# Patient Record
Sex: FEMALE | Race: WHITE | NOT HISPANIC OR LATINO | Employment: OTHER | URBAN - METROPOLITAN AREA
[De-identification: names, ages, dates, MRNs, and addresses within clinical notes are randomized per-mention and may not be internally consistent; named-entity substitution may affect disease eponyms.]

---

## 2018-08-21 ENCOUNTER — OFFICE VISIT (OUTPATIENT)
Dept: CARDIOLOGY | Facility: CLINIC | Age: 68
End: 2018-08-21

## 2018-08-21 VITALS
HEIGHT: 66 IN | BODY MASS INDEX: 34.75 KG/M2 | WEIGHT: 216.2 LBS | HEART RATE: 79 BPM | DIASTOLIC BLOOD PRESSURE: 80 MMHG | SYSTOLIC BLOOD PRESSURE: 130 MMHG

## 2018-08-21 DIAGNOSIS — R00.2 PALPITATIONS: Primary | ICD-10-CM

## 2018-08-21 DIAGNOSIS — E66.09 CLASS 2 OBESITY DUE TO EXCESS CALORIES WITHOUT SERIOUS COMORBIDITY WITH BODY MASS INDEX (BMI) OF 35.0 TO 35.9 IN ADULT: ICD-10-CM

## 2018-08-21 DIAGNOSIS — I49.3 PVC (PREMATURE VENTRICULAR CONTRACTION): ICD-10-CM

## 2018-08-21 DIAGNOSIS — R07.2 PRECORDIAL PAIN: ICD-10-CM

## 2018-08-21 PROCEDURE — 99204 OFFICE O/P NEW MOD 45 MIN: CPT | Performed by: INTERNAL MEDICINE

## 2018-08-21 PROCEDURE — 93000 ELECTROCARDIOGRAM COMPLETE: CPT | Performed by: INTERNAL MEDICINE

## 2018-08-21 RX ORDER — CHLORTHALIDONE 25 MG/1
25 TABLET ORAL DAILY
COMMUNITY

## 2018-08-21 RX ORDER — SIMVASTATIN 40 MG
40 TABLET ORAL NIGHTLY
COMMUNITY

## 2018-08-21 RX ORDER — LEVOTHYROXINE SODIUM 0.05 MG/1
50 TABLET ORAL DAILY
COMMUNITY
End: 2022-09-22 | Stop reason: HOSPADM

## 2018-08-21 RX ORDER — LANSOPRAZOLE 30 MG/1
30 CAPSULE, DELAYED RELEASE ORAL DAILY
COMMUNITY

## 2018-08-21 RX ORDER — ATENOLOL 50 MG/1
50 TABLET ORAL 2 TIMES DAILY
COMMUNITY
End: 2022-09-22 | Stop reason: HOSPADM

## 2018-08-21 RX ORDER — LISINOPRIL 20 MG/1
20 TABLET ORAL DAILY
COMMUNITY
End: 2022-09-22 | Stop reason: HOSPADM

## 2018-08-21 RX ORDER — GLIMEPIRIDE 4 MG/1
4 TABLET ORAL 2 TIMES DAILY
COMMUNITY
End: 2022-09-22 | Stop reason: HOSPADM

## 2018-08-21 NOTE — PROGRESS NOTES
Date of Office Visit: 2018  Encounter Provider: Brian Weems MD  Place of Service: [facility] CARDIOLOGY  Patient Name: Alejandra Varner  :1950  [id number]    Chief Complaint   Patient presents with   • Irregular Heart Beat   :     HPI: Alejandra Varner is a 68 y.o. female She comes to see me.  I have seen here before a long time ago for palpitations and she was having a few premature ventricular contractions.  She was doing pretty well but then, a couple of months ago, they really started kicking up.  She started having a lot of premature ventricular contractions and was very symptomatic with them.  She was a little short of breath and had a little chest discomfort.  They were not really related to activity.  Nothing seems to make them better or worse although, if she is having them and she has some caffeine, they get a lot more pronounced.  They do not wake her up from sleep.  She does not have syncope.  She has had her thyroid checked and her potassium checked and it is okay.  She is tired.  She does snore.  She has never been checked to see if she has sleep apnea.  She had an event recorder put on and had a lot of ectopy on that but no ventricular tachycardia.  She has diabetes.  She has hypertension and hyperlipidemia.  She is obese.  She does not smoke.  She is under a lot of stress because her  has been sick, and I take care of him.        Past Medical History:   Diagnosis Date   • Chronic kidney disease    • Diabetes 1.5, managed as type 2 (CMS/[facility])    • Hernia, hiatal    • Hyperlipidemia    • Hypertension    • Thyroid trouble        Past Surgical History:   Procedure Laterality Date   • CYSTOSCOPY BLADDER STONE LITHOTRIPSY     • TOTAL ABDOMINAL HYSTERECTOMY         Social History     Social History   • Marital status:      Spouse name: N/A   • Number of children: N/A   • Years of education: N/A     Occupational History   • Not on file.     Social  History Main Topics   • Smoking status: Not on file   • Smokeless tobacco: Not on file   • Alcohol use Not on file   • Drug use: Unknown   • Sexual activity: Not on file     Other Topics Concern   • Not on file     Social History Narrative   • No narrative on file       No family history on file.    Review of Systems   Constitution: Negative for decreased appetite, fever, malaise/fatigue and weight loss.   HENT: Negative for nosebleeds.    Eyes: Negative for double vision.   Cardiovascular: Negative for chest pain, claudication, cyanosis, dyspnea on exertion, irregular heartbeat, leg swelling, near-syncope, orthopnea, palpitations, paroxysmal nocturnal dyspnea and syncope.   Respiratory: Negative for cough, hemoptysis and shortness of breath.    Hematologic/Lymphatic: Negative for bleeding problem.   Skin: Negative for rash.   Musculoskeletal: Negative for falls and myalgias.   Gastrointestinal: Negative for hematochezia, jaundice, melena, nausea and vomiting.   Genitourinary: Negative for hematuria.   Neurological: Negative for dizziness and seizures.   Psychiatric/Behavioral: Negative for altered mental status and memory loss.       No Known Allergies    No current outpatient prescriptions on file.      Objective:     There were no vitals filed for this visit.  There is no height or weight on file to calculate BMI.    Physical Exam   Constitutional: She is oriented to person, place, and time. She appears well-developed and well-nourished.   HENT:   Head: Normocephalic.   Eyes: No scleral icterus.   Neck: No JVD present. No thyromegaly present.   Cardiovascular: Normal rate, regular rhythm and normal heart sounds.  Exam reveals no gallop and no friction rub.    No murmur heard.  Pulmonary/Chest: Effort normal and breath sounds normal. She has no wheezes. She has no rales.   Abdominal: Soft. There is no hepatosplenomegaly. There is no tenderness.   Musculoskeletal: Normal range of motion. She exhibits no edema.    Lymphadenopathy:     She has no cervical adenopathy.   Neurological: She is alert and oriented to person, place, and time.   Skin: Skin is warm and dry. No rash noted.   Psychiatric: She has a normal mood and affect.         ECG 12 Lead  Date/Time: 8/21/2018 11:49 AM  Performed by: REGULO WEEMS  Authorized by: REGULO WEEMS   Comparison: compared with previous ECG   Similar to previous ECG  Rhythm: sinus rhythm  Clinical impression: normal ECG  Comments: ns ST-T wave abnormality             Assessment:       Diagnosis Plan   1. Palpitations     2. PVC (premature ventricular contraction)     3. Precordial pain     4. Class 2 obesity due to excess calories without serious comorbidity with body mass index (BMI) of 35.0 to 35.9 in adult            Plan:       She is having pretty symptomatic premature ventricular contractions and she is having a lot of them.  I think that we need to repeat her testing that we did years ago on her.  I would love her to get a regular treadmill and see what she does, although she is worried with arthritis in her knee that she may not be able to do that, but we are going to try it.  I would like her to get an echocardiogram.  I have asked her to start on some magnesium and see if that helps knock these down.  If it does not then we are going to put her on flecainide 50 mg twice a day if her stress and echocardiogram are normal.  I am also going to check her for a sleep study.        As always, it has been a pleasure to participate in your patient's care.      Sincerely,       Regulo Weems MD

## 2018-08-30 ENCOUNTER — HOSPITAL ENCOUNTER (OUTPATIENT)
Dept: CARDIOLOGY | Facility: HOSPITAL | Age: 68
Discharge: HOME | End: 2018-08-30
Attending: INTERNAL MEDICINE | Admitting: INTERNAL MEDICINE

## 2018-08-30 ENCOUNTER — HOSPITAL ENCOUNTER (OUTPATIENT)
Dept: CARDIOLOGY | Facility: HOSPITAL | Age: 68
Discharge: HOME | End: 2018-08-30
Attending: INTERNAL MEDICINE

## 2018-08-30 VITALS
SYSTOLIC BLOOD PRESSURE: 148 MMHG | DIASTOLIC BLOOD PRESSURE: 74 MMHG | WEIGHT: 216 LBS | HEART RATE: 85 BPM | HEIGHT: 66 IN | BODY MASS INDEX: 34.72 KG/M2

## 2018-08-30 DIAGNOSIS — I49.3 PVC (PREMATURE VENTRICULAR CONTRACTION): ICD-10-CM

## 2018-08-30 DIAGNOSIS — R07.2 PRECORDIAL PAIN: ICD-10-CM

## 2018-08-30 DIAGNOSIS — E66.09 CLASS 2 OBESITY DUE TO EXCESS CALORIES WITHOUT SERIOUS COMORBIDITY WITH BODY MASS INDEX (BMI) OF 35.0 TO 35.9 IN ADULT: ICD-10-CM

## 2018-08-30 DIAGNOSIS — R00.2 PALPITATIONS: ICD-10-CM

## 2018-08-30 PROCEDURE — 93016 CV STRESS TEST SUPVJ ONLY: CPT | Performed by: INTERNAL MEDICINE

## 2018-08-30 PROCEDURE — 93018 CV STRESS TEST I&R ONLY: CPT | Performed by: INTERNAL MEDICINE

## 2018-08-30 PROCEDURE — 93017 CV STRESS TEST TRACING ONLY: CPT

## 2018-08-30 PROCEDURE — 93306 TTE W/DOPPLER COMPLETE: CPT | Performed by: INTERNAL MEDICINE

## 2018-08-30 PROCEDURE — 93306 TTE W/DOPPLER COMPLETE: CPT

## 2018-08-31 ENCOUNTER — TELEPHONE (OUTPATIENT)
Dept: CARDIOLOGY | Facility: CLINIC | Age: 68
End: 2018-08-31

## 2018-09-05 ENCOUNTER — APPOINTMENT (OUTPATIENT)
Dept: SLEEP MEDICINE | Facility: HOSPITAL | Age: 68
End: 2018-09-05
Attending: INTERNAL MEDICINE

## 2021-10-12 ENCOUNTER — IMMUNIZATION (OUTPATIENT)
Dept: VACCINE CLINIC | Facility: HOSPITAL | Age: 71
End: 2021-10-12

## 2021-10-12 PROCEDURE — 91300 HC SARSCOV02 VAC 30MCG/0.3ML IM: CPT | Performed by: INTERNAL MEDICINE

## 2021-10-12 PROCEDURE — 0004A ADM SARSCOV2 30MCG/0.3ML BOOSTER: CPT | Performed by: INTERNAL MEDICINE

## 2022-06-16 PROCEDURE — 93321 DOPPLER ECHO F-UP/LMTD STD: CPT | Performed by: INTERNAL MEDICINE

## 2022-06-16 PROCEDURE — 93325 DOPPLER ECHO COLOR FLOW MAPG: CPT | Performed by: INTERNAL MEDICINE

## 2022-06-16 PROCEDURE — 93308 TTE F-UP OR LMTD: CPT | Performed by: INTERNAL MEDICINE

## 2022-06-21 ENCOUNTER — OUTSIDE FACILITY SERVICE (OUTPATIENT)
Dept: CARDIOLOGY | Facility: CLINIC | Age: 72
End: 2022-06-21

## 2022-08-23 ENCOUNTER — TELEPHONE (OUTPATIENT)
Dept: NEUROSURGERY | Facility: CLINIC | Age: 72
End: 2022-08-23

## 2022-08-23 ENCOUNTER — OFFICE VISIT (OUTPATIENT)
Dept: NEUROSURGERY | Facility: CLINIC | Age: 72
End: 2022-08-23

## 2022-08-23 VITALS
SYSTOLIC BLOOD PRESSURE: 130 MMHG | HEIGHT: 66 IN | DIASTOLIC BLOOD PRESSURE: 84 MMHG | TEMPERATURE: 97.9 F | WEIGHT: 216 LBS | HEART RATE: 100 BPM | OXYGEN SATURATION: 96 % | BODY MASS INDEX: 34.72 KG/M2

## 2022-08-23 DIAGNOSIS — M54.50 ACUTE MIDLINE LOW BACK PAIN WITHOUT SCIATICA: ICD-10-CM

## 2022-08-23 DIAGNOSIS — M48.02 CERVICAL SPINAL STENOSIS: Primary | ICD-10-CM

## 2022-08-23 PROCEDURE — 99204 OFFICE O/P NEW MOD 45 MIN: CPT | Performed by: NEUROLOGICAL SURGERY

## 2022-08-23 RX ORDER — TRAMADOL HYDROCHLORIDE 50 MG/1
50 TABLET ORAL 2 TIMES DAILY PRN
COMMUNITY
Start: 2022-07-23

## 2022-08-23 RX ORDER — DEXAMETHASONE 4 MG/1
8 TABLET ORAL TAKE AS DIRECTED
Qty: 2 TABLET | Refills: 0 | Status: SHIPPED | OUTPATIENT
Start: 2022-08-23 | End: 2022-09-22 | Stop reason: HOSPADM

## 2022-08-23 RX ORDER — CHOLECALCIFEROL (VITAMIN D3) 50 MCG
2000 TABLET ORAL NIGHTLY
COMMUNITY

## 2022-08-23 RX ORDER — CYCLOBENZAPRINE HCL 5 MG
5 TABLET ORAL NIGHTLY PRN
COMMUNITY
Start: 2022-06-16

## 2022-08-23 RX ORDER — PROPRANOLOL HYDROCHLORIDE 40 MG/1
40 TABLET ORAL 2 TIMES DAILY
COMMUNITY

## 2022-08-23 RX ORDER — PREGABALIN 50 MG/1
50 CAPSULE ORAL NIGHTLY
COMMUNITY

## 2022-08-23 RX ORDER — LEVOTHYROXINE SODIUM 0.07 MG/1
TABLET ORAL
COMMUNITY
Start: 2022-06-18 | End: 2022-09-22 | Stop reason: HOSPADM

## 2022-08-23 RX ORDER — DEXLANSOPRAZOLE 60 MG/1
CAPSULE, DELAYED RELEASE ORAL DAILY
COMMUNITY
Start: 2013-11-22 | End: 2022-09-22 | Stop reason: HOSPADM

## 2022-08-23 RX ORDER — TIRZEPATIDE 2.5 MG/.5ML
INJECTION, SOLUTION SUBCUTANEOUS WEEKLY
COMMUNITY
Start: 2022-08-10

## 2022-08-23 RX ORDER — LISINOPRIL 20 MG/1
TABLET ORAL DAILY
COMMUNITY
Start: 2013-11-22 | End: 2022-09-22 | Stop reason: HOSPADM

## 2022-08-23 RX ORDER — GLIPIZIDE 5 MG/1
5 TABLET ORAL
COMMUNITY

## 2022-08-23 RX ORDER — ALBUTEROL SULFATE 90 UG/1
AEROSOL, METERED RESPIRATORY (INHALATION)
COMMUNITY
Start: 2014-01-02 | End: 2022-09-22 | Stop reason: HOSPADM

## 2022-08-23 RX ORDER — SIMVASTATIN 40 MG
TABLET ORAL DAILY
COMMUNITY
Start: 2013-11-22 | End: 2022-09-22 | Stop reason: HOSPADM

## 2022-08-23 RX ORDER — ALLOPURINOL 100 MG/1
100 TABLET ORAL NIGHTLY
COMMUNITY
Start: 2022-08-20

## 2022-08-23 RX ORDER — LANOLIN ALCOHOL/MO/W.PET/CERES
400 CREAM (GRAM) TOPICAL 2 TIMES DAILY
COMMUNITY
Start: 2022-06-16 | End: 2022-11-11 | Stop reason: HOSPADM

## 2022-08-23 NOTE — PROGRESS NOTES
"Subjective   Patient ID: Alejandra Varner is a 72 y.o. female is being seen for consultation today at the request of Elysia Reich MD for Foraminal stenosis of cervical region Bone spur. Patient [place] [facility] [place]    Treatment:no recent treatment    Today patient states that she has N/T in B/L Hands, along with R upper arm pain. Patient states that she has low back pain as well.     Patient, Provider, and MA are all wearing a mask in our office today.     History of Present Illness     This patient has been having pain in her neck as well as in her lower back since April of this year.  The problem originally began without a particular incident.  She has noted that she has pain in her neck with some radiation into her right shoulder.  She also has pain in her lower back but not a lot of radiation into her legs.  Her biggest problem is numbness and tingling as well as weakness in her hands.  This is been getting steadily worse.    The following portions of the patient's history were reviewed and updated as appropriate: allergies, current medications, past family history, past medical history, past social history, past surgical history and problem list.    Review of Systems   Constitutional: Negative for chills and fever.   HENT: Negative for congestion.    Musculoskeletal: Positive for back pain and myalgias.        R upper arm pain   Neurological: Positive for weakness and numbness. Negative for headaches.        B/L hands       I have reviewed the review of systems as documented by my MA.      Objective     Vitals:    08/23/22 1217   BP: 130/84   Cuff Size: Adult   Pulse: 100   Temp: 97.9 °F (36.6 °C)   SpO2: 96%   Weight: 98 kg (216 lb)   Height: 167.6 cm (66\")     Body mass index is 34.86 kg/m².      Physical Exam  Constitutional:       Appearance: She is well-developed.   HENT:      Head: Normocephalic and atraumatic.   Eyes:      Extraocular Movements: EOM normal. "      Conjunctiva/sclera: Conjunctivae normal.      Pupils: Pupils are equal, round, and reactive to light.   Neck:      Vascular: No carotid bruit.   Neurological:      Mental Status: She is oriented to person, place, and time.      Coordination: Finger-Nose-Finger Test and Heel to Shin Test normal.      Gait: Gait is intact.      Deep Tendon Reflexes:      Reflex Scores:       Tricep reflexes are 2+ on the right side and 2+ on the left side.       Bicep reflexes are 2+ on the right side and 2+ on the left side.       Brachioradialis reflexes are 2+ on the right side and 2+ on the left side.       Patellar reflexes are 2+ on the right side and 2+ on the left side.       Achilles reflexes are 2+ on the right side and 2+ on the left side.  Psychiatric:         Speech: Speech normal.       Neurologic Exam     Mental Status   Oriented to person, place, and time.   Registration of memory: Good recent and remote memory.   Attention: normal. Concentration: normal.   Speech: speech is normal   Level of consciousness: alert  Knowledge: consistent with education.     Cranial Nerves     CN II   Visual fields full to confrontation.   Visual acuity: normal    CN III, IV, VI   Pupils are equal, round, and reactive to light.  Extraocular motions are normal.     CN V   Facial sensation intact.   Right corneal reflex: normal  Left corneal reflex: normal    CN VII   Facial expression full, symmetric.   Right facial weakness: none  Left facial weakness: none    CN VIII   Hearing: intact    CN IX, X   Palate: symmetric    CN XI   Right sternocleidomastoid strength: normal  Left sternocleidomastoid strength: normal    CN XII   Tongue: not atrophic  Tongue deviation: none    Motor Exam   Muscle bulk: normal  Right arm tone: normal  Left arm tone: normal  Right leg tone: normal  Left leg tone: normal    Strength   Strength 5/5 except as noted.     Sensory Exam   Light touch normal.     Gait, Coordination, and Reflexes     Gait  Gait:  normal    Coordination   Finger to nose coordination: normal  Heel to shin coordination: normal    Reflexes   Right brachioradialis: 2+  Left brachioradialis: 2+  Right biceps: 2+  Left biceps: 2+  Right triceps: 2+  Left triceps: 2+  Right patellar: 2+  Left patellar: 2+  Right achilles: 2+  Left achilles: 2+  Right : 2+  Left : 2+          Assessment & Plan   Independent Review of Radiographic Studies:      I personally reviewed the images from the following studies.    I reviewed an MRI of her cervical spine which shows multilevel spinal stenosis in the cervical spine.  There is no high-level imaging of the lumbar spine.    Medical Decision Making:      I told the patient and her  that we really need to proceed with a cervical and lumbar myelogram.  I told the patient what a myelogram involves.  I explained that there is a 50% chance of developing a bad headache and nausea as a result of the test.  I explained that there is also a very small chance of infection, seizures, and bleeding.  I explained how we would treat a post myelogram headache including bedrest, caffeinated fluids, steroids, and blood patch.  The patient does ask to proceed.    Diagnoses and all orders for this visit:    1. Cervical spinal stenosis (Primary)  -     IR Myelogram 2 or More Areas; Future  -     CT Cervical Spine With Intrathecal Contrast; Future  -     CT Lumbar Spine With Intrathecal Contrast; Future  -     XR Spine Cervical Complete With Flex Ext; Future  -     XR Spine Lumbar Complete With Flex & Ext; Future  -     dexamethasone (DECADRON) 4 MG tablet; Take 2 tablets by mouth Take As Directed. Take both tablets by mouth 2 hours before myelogram  Dispense: 2 tablet; Refill: 0    2. Acute midline low back pain without sciatica  -     IR Myelogram 2 or More Areas; Future  -     CT Cervical Spine With Intrathecal Contrast; Future  -     CT Lumbar Spine With Intrathecal Contrast; Future  -     XR Spine Cervical Complete  With Flex Ext; Future  -     XR Spine Lumbar Complete With Flex & Ext; Future  -     dexamethasone (DECADRON) 4 MG tablet; Take 2 tablets by mouth Take As Directed. Take both tablets by mouth 2 hours before myelogram  Dispense: 2 tablet; Refill: 0      Return for After radiology test.

## 2022-08-23 NOTE — TELEPHONE ENCOUNTER
I called pt's urologist Donato Barnard [phone]. I had to leave a message on the nurse's line. We are planning to do a myelogram on this pt an since she has kidney issues we need to know if she will need fluids via IV either before and or after procedure. If so, what, how much and when it should be administered. I also faxed office note to them with written request for this info. Fax [phone]

## 2022-08-25 ENCOUNTER — TELEPHONE (OUTPATIENT)
Dept: NEUROSURGERY | Facility: CLINIC | Age: 72
End: 2022-08-25

## 2022-08-25 NOTE — TELEPHONE ENCOUNTER
Diana from the office returned call to the office. She states this is her urologist but he would exercise extreme caution as her last creatinine level was 1.49 and GFR was 35. He recommends clarification from patient's nephrologist as well. She will fax the response to the office from Dr. Nicholson.    Letter mailed to patient.    Mari Rizo MA

## 2022-08-25 NOTE — TELEPHONE ENCOUNTER
I l/m for Sudhir Metcalf, DO at [facility]. We are planning to give this pt a Ct myelogram test and she has kidney issues. We need him to ok her to have the approve the test and if he does then we also need for him to either order before and after fluids or give us specific instructions for the hospital.

## 2022-08-30 NOTE — TELEPHONE ENCOUNTER
Dr. Metcalf's office called and requested a written request for clearance, request was faxed to [phone].

## 2022-09-11 NOTE — PROGRESS NOTES
09/22/22 0003   Pre-Procedure Phone Call   Procedure Time Verified Yes   Arrival Time 0600   Procedure Location Verified Yes   Medical History Reviewed No   NPO Status Reinforced Yes   Ride and Caregiver Arranged Yes   Patient Knows to Bring Current Medications   (No changes in medications since last reviewed. Decadron RX'd)   Bring Outside Films Requested   (Dr German patient)

## 2022-09-22 ENCOUNTER — OFFICE VISIT (OUTPATIENT)
Dept: NEUROSURGERY | Facility: CLINIC | Age: 72
End: 2022-09-22

## 2022-09-22 ENCOUNTER — HOSPITAL ENCOUNTER (OUTPATIENT)
Dept: CT IMAGING | Facility: HOSPITAL | Age: 72
Discharge: HOME | End: 2022-09-22

## 2022-09-22 ENCOUNTER — HOSPITAL ENCOUNTER (OUTPATIENT)
Dept: GENERAL RADIOLOGY | Facility: HOSPITAL | Age: 72
Discharge: HOME | End: 2022-09-22

## 2022-09-22 ENCOUNTER — PREP FOR SURGERY (OUTPATIENT)
Dept: OTHER | Facility: HOSPITAL | Age: 72
End: 2022-09-22

## 2022-09-22 VITALS
TEMPERATURE: 98 F | SYSTOLIC BLOOD PRESSURE: 134 MMHG | OXYGEN SATURATION: 97 % | WEIGHT: 205 LBS | BODY MASS INDEX: 32.95 KG/M2 | HEIGHT: 66 IN | HEART RATE: 88 BPM | DIASTOLIC BLOOD PRESSURE: 80 MMHG

## 2022-09-22 VITALS
BODY MASS INDEX: 32.95 KG/M2 | SYSTOLIC BLOOD PRESSURE: 144 MMHG | DIASTOLIC BLOOD PRESSURE: 79 MMHG | TEMPERATURE: 96.9 F | OXYGEN SATURATION: 100 % | HEIGHT: 66 IN | HEART RATE: 70 BPM | RESPIRATION RATE: 16 BRPM | WEIGHT: 205 LBS

## 2022-09-22 DIAGNOSIS — M54.50 ACUTE MIDLINE LOW BACK PAIN WITHOUT SCIATICA: ICD-10-CM

## 2022-09-22 DIAGNOSIS — M48.02 CERVICAL SPINAL STENOSIS: ICD-10-CM

## 2022-09-22 DIAGNOSIS — M48.02 CERVICAL SPINAL STENOSIS: Primary | ICD-10-CM

## 2022-09-22 DIAGNOSIS — E66.09 CLASS 2 OBESITY DUE TO EXCESS CALORIES WITHOUT SERIOUS COMORBIDITY WITH BODY MASS INDEX (BMI) OF 35.0 TO 35.9 IN ADULT: ICD-10-CM

## 2022-09-22 PROCEDURE — 72052 X-RAY EXAM NECK SPINE 6/>VWS: CPT

## 2022-09-22 PROCEDURE — 0 LIDOCAINE 1 % SOLUTION: Performed by: NEUROLOGICAL SURGERY

## 2022-09-22 PROCEDURE — 72132 CT LUMBAR SPINE W/DYE: CPT

## 2022-09-22 PROCEDURE — 99214 OFFICE O/P EST MOD 30 MIN: CPT | Performed by: NEUROLOGICAL SURGERY

## 2022-09-22 PROCEDURE — 72240 MYELOGRAPHY NECK SPINE: CPT

## 2022-09-22 PROCEDURE — 25010000002 IOPAMIDOL 61 % SOLUTION: Performed by: NEUROLOGICAL SURGERY

## 2022-09-22 PROCEDURE — 62305 MYELOGRAPHY LUMBAR INJECTION: CPT

## 2022-09-22 PROCEDURE — 72126 CT NECK SPINE W/DYE: CPT

## 2022-09-22 PROCEDURE — 62284 INJECTION FOR MYELOGRAM: CPT

## 2022-09-22 PROCEDURE — 72114 X-RAY EXAM L-S SPINE BENDING: CPT

## 2022-09-22 PROCEDURE — 62284 INJECTION FOR MYELOGRAM: CPT | Performed by: NEUROLOGICAL SURGERY

## 2022-09-22 RX ORDER — CEFAZOLIN SODIUM 2 G/100ML
2 INJECTION, SOLUTION INTRAVENOUS ONCE
Status: CANCELLED | OUTPATIENT
Start: 2022-10-26 | End: 2022-09-22

## 2022-09-22 RX ORDER — LIDOCAINE HYDROCHLORIDE 10 MG/ML
10 INJECTION, SOLUTION INFILTRATION; PERINEURAL ONCE
Status: COMPLETED | OUTPATIENT
Start: 2022-09-22 | End: 2022-09-22

## 2022-09-22 RX ORDER — HYDROCODONE BITARTRATE AND ACETAMINOPHEN 5; 325 MG/1; MG/1
1 TABLET ORAL EVERY 4 HOURS PRN
Status: DISCONTINUED | OUTPATIENT
Start: 2022-09-22 | End: 2022-09-23 | Stop reason: HOSPADM

## 2022-09-22 RX ORDER — ACETAMINOPHEN 325 MG/1
650 TABLET ORAL EVERY 4 HOURS PRN
Status: DISCONTINUED | OUTPATIENT
Start: 2022-09-22 | End: 2022-09-23 | Stop reason: HOSPADM

## 2022-09-22 RX ADMIN — LIDOCAINE HYDROCHLORIDE 7 ML: 10 INJECTION, SOLUTION INFILTRATION; PERINEURAL at 06:58

## 2022-09-22 RX ADMIN — IOPAMIDOL 15 ML: 612 INJECTION, SOLUTION INTRATHECAL at 06:59

## 2022-09-22 NOTE — PROGRESS NOTES
"Subjective   Patient ID: Alejandra Varner is a 72 y.o. female is here today for follow-up with a new Cervical/Lumbar Myelogram that was done on 09/22/2022 for back, neck and arm pain.    Today patient reports that her symptoms are unchanged. Patient states that she has N/T in B/L hands, along with R upper arm pain, and low back pain    Patient, Provider, and MA are all wearing a mask in our office today    History of Present Illness    This patient returns today.  She had a myelogram this morning.  She continues with pain in her neck and in her lower back.  Pain in her neck radiates into her right shoulder.  She has pain in her lower back as well but not a lot of radiation into her legs.  All of this is been getting steadily worse.    The following portions of the patient's history were reviewed and updated as appropriate: allergies, current medications, past family history, past medical history, past social history, past surgical history and problem list.    Review of Systems   Constitutional: Negative for chills and fever.   HENT: Negative for congestion.    Musculoskeletal: Positive for back pain, myalgias, neck pain and neck stiffness.   Neurological: Positive for weakness and numbness.       I have reviewed the review of systems as documented by my MA.      Objective     Vitals:    09/22/22 1012   BP: 134/80   Cuff Size: Adult   Pulse: 88   Temp: 98 °F (36.7 °C)   SpO2: 97%   Weight: 93 kg (205 lb)   Height: 167.6 cm (66\")     Body mass index is 33.09 kg/m².      Physical Exam  Neurological:      Mental Status: She is alert and oriented to person, place, and time.       Neurologic Exam     Mental Status   Oriented to person, place, and time.           Assessment & Plan   Independent Review of Radiographic Studies:      I personally reviewed the images from the following studies.    I reviewed her plain films, myelogram, and CT scan.  They were all done this morning so there is not yet a report.  On the plain " films of the lumbar spine there is multilevel degenerative disease and some mild degenerative scoliosis.  I do not see any evidence of instability.  In the cervical spine there is also multilevel degenerative disease but again no evidence of instability.  On the myelogram there is fairly severe lateral recess stenosis on the right side at L3-4.  The other levels mostly fill out okay.  There is a little smooth narrowing on the left side at L4-5 and really just behind the L4 vertebral body.  There is a little narrowing on the left at L3-4 as well.  In the cervical spine there is a nerve root cut out on the right side at C5-6.  The other levels fill out pretty well and the left side fills out pretty well there is a nerve root cut out at C6-7 on the left but not as bad as the one on the right.  On the post myelographic CT scan of the cervical spine C2-3 is widely open.  C3-4 shows a little foraminal narrowing on the right but it is mild.  C4-5 shows widely patent canal and neuroforamina.  C5-6 shows fairly severe foraminal narrowing on the right consistent with the myelogram.  C6-7 shows some foraminal narrowing on the left consistent with the myelogram.  C7-T1 looks okay.  The upper thoracic levels look okay as well.  On the post myelographic CT scan of the lumbar spine the lower thoracic spine down to L2 looks okay.  There is some lateral recess narrowing and possibly a small disc herniation into the neuroforamen on the left at L2-3.  L3-4 shows bilateral narrowing and pretty severe central stenosis.  L4-5 shows more moderate central stenosis as well.  L5-S1 shows that the thecal sac ends but is for the most part open.    Medical Decision Making:      I had a long discussion with the patient and her  about the situation.  I explained that since her neck and right arm are bothering her more than her lower back we could consider surgery on her neck.  This would be a C5-6 and C6-7 anterior cervical discectomy  fusion and instrumentation.  She has never had any nonsurgical treatment for her lower back and so I think it would be better to treat the lower back with some physical therapy and epidural blocks before we consider surgery while she is getting her body in better shape and in particular losing some weight.  I told her unless she loses weight there is no chance of getting her back better and keeping it better.  I told the patient about the surgery which is called an anterior cervical discectomy.  I explained that there is an 80% chance of getting rid of the arm pain and any other arm symptoms.  I also explained that the patient would still have neck pain.  Initially this will be quite severe however it will improve with healing from the surgery.  There is also a risk of infection, bleeding, paralysis, and anesthetic risk.  There is a risk of damage to the soft tissues of the neck such as the esophagus, carotids, and trachea.  All of these risks are about 2 or 3%.  There is about a 5% chance of nonunion or failure of the instrumentation.  There is also a about a 5% chance of hoarseness and trouble swallowing do to damage to the recurrent laryngeal nerve.  We discussed the postoperative hospital and home course as well.  The patient does ask to think about it and will call if she wishes to proceed.  She did ask if epidural blocks in the neck would help and I really do not think they would as most of the problem is in her hand and her arm.    If she calls she will need to be scheduled for a: Cervical 5 to cervical 6 and cervical 6 to cervical 7 anterior cervical discectomy, fusion and instrumentation or she could consider lumbar epidural blocks and lumbar PT.  I would not try to treat both ends at 1 time.    Subsequent to this note the patient said that she wanted to proceed with surgery.    Diagnoses and all orders for this visit:    1. Cervical spinal stenosis (Primary)    2. Acute midline low back pain without  sciatica    3. Class 2 obesity due to excess calories without serious comorbidity with body mass index (BMI) of 35.0 to 35.9 in adult      Return for Recheck and call after treatment or consultation.

## 2022-09-22 NOTE — DISCHARGE INSTRUCTIONS
EDUCATION /DISCHARGE INSTRUCTIONS:    A myelogram is a special radiology procedure of the spinal cord, spinal nerves and other related structures.  You will be awake during the examination.  An area of your lower back will be cleansed with an antiseptic solution.  The physician will inject a numbing medication in your lower back.  While your back is numb, a needle will be placed in the lower back area.  A small amount of spinal fluid may be withdrawn and sent to the lab if ordered by your physician. While the needle is in the back, an injection of a contrast material (xray dye) will be given through the needle.  The contrast material will allow the physician to see the spinal cord and spinal nerves.  Once injected, the needle will be removed and a band aid will be placed over the injection site.  The table will be tilted during the process to allow the contrast material to flow to particular areas in the spine.  Following the injection and xrays, you will be taken to the CT scanner where more pictures will be taken. After the procedure is finished, the contrast material will be absorbed by your body and eliminated through your kidneys.  The radiologist will study and interpret your myelogram and send the results to your physician.  Procedure risks of a myelogram include, but are not limited to:  *  Bleeding   *  Seizure  *  Infection   *  Headache, possibly severe requiring a blood patch  *  Contrast reaction  *  Nerve or cord injury  *  Paralysis and death  Benefits of the procedure:  *  Best examination for delineating pathology related to spinal cord compression from a disc and/or nerve root compression  Alternatives to the procedure:  MRI - a non invasive procedure requiring intravenous contrast injection.  Cannot be done on patients with certain pacemakers or metal in the body.  MRI risks include possible reaction to the contrast material, movement of metal located in the body.Benefit to MRI:  Non-invasive and  usually painless procedure.  THIS EDUCATION INFORMATION WAS REVIEWED PRIOR TO PROCEDURE AND CONSENT. Patient initials____BL____________Time______6:22____________  24 hour rest period ends _________10:00 am 9/23/22 Friday___________.  Important information following your myelogram:  * ACTIVITY:   *  You may sit up in the car to go home.  *  When you get home, remain on bed rest (flat on your back or on your side) for 24 hours. You may place a rolled up towel under your neck for support  * You may get up to the bathroom and sit up to eat and drink then lie back down  * Drink additional fluids for 24 hours after the myelogram.   * Continue to drink additional fluids for the next 2-3 days. Water and caffeinated beverages are encouraged.  * Remain less active for the next two to three days.  * Do not drive for 24 hours following a myelogram.  * You may remove the bandage and shower in the morning.    CALL YOUR PHYSICIAN FOR THE FOLLOWING:  * Pain at the injection site  * Redness, swelling, bruising or drainage at the injection site.  * A fever by mouth of 101.0 or any new symptoms  Headaches are a common side effect after a myelogram.  If you get a headache, you should stay flat in bed and drink plenty of fluids. If the headache persists and does not go away with rest/medication,   CALL Dr. German at [phone]

## 2022-09-22 NOTE — NURSING NOTE
Pt arrived in radiology triage for Myelogram.  Protective goggles and mask in place with all patient interactions today

## 2022-09-22 NOTE — NURSING NOTE
Pt transported, via w/c, to Dr. German' office where son and  were waiting.  Pt tolerated procedure well, had no additional questions, concerns, or new c/o.

## 2022-09-23 ENCOUNTER — TELEPHONE (OUTPATIENT)
Dept: INTERVENTIONAL RADIOLOGY/VASCULAR | Facility: HOSPITAL | Age: 72
End: 2022-09-23

## 2022-10-10 ENCOUNTER — PRE-ADMISSION TESTING (OUTPATIENT)
Dept: PREADMISSION TESTING | Facility: HOSPITAL | Age: 72
End: 2022-10-10

## 2022-10-10 VITALS
HEIGHT: 66 IN | RESPIRATION RATE: 16 BRPM | DIASTOLIC BLOOD PRESSURE: 63 MMHG | HEART RATE: 63 BPM | WEIGHT: 210 LBS | BODY MASS INDEX: 33.75 KG/M2 | SYSTOLIC BLOOD PRESSURE: 140 MMHG | TEMPERATURE: 97.6 F | OXYGEN SATURATION: 96 %

## 2022-10-10 LAB
ANION GAP SERPL CALCULATED.3IONS-SCNC: 8.8 MMOL/L (ref 5–15)
BUN SERPL-MCNC: 22 MG/DL (ref 8–23)
BUN/CREAT SERPL: 13.9 (ref 7–25)
CALCIUM SPEC-SCNC: 9.5 MG/DL (ref 8.6–10.5)
CHLORIDE SERPL-SCNC: 103 MMOL/L (ref 98–107)
CO2 SERPL-SCNC: 27.2 MMOL/L (ref 22–29)
CREAT SERPL-MCNC: 1.58 MG/DL (ref 0.57–1)
DEPRECATED RDW RBC AUTO: 46.6 FL (ref 37–54)
EGFRCR SERPLBLD CKD-EPI 2021: 34.4 ML/MIN/1.73
ERYTHROCYTE [DISTWIDTH] IN BLOOD BY AUTOMATED COUNT: 13.7 % (ref 12.3–15.4)
GLUCOSE SERPL-MCNC: 197 MG/DL (ref 65–99)
HCT VFR BLD AUTO: 33.3 % (ref 34–46.6)
HGB BLD-MCNC: 11.2 G/DL (ref 12–15.9)
MCH RBC QN AUTO: 31.5 PG (ref 26.6–33)
MCHC RBC AUTO-ENTMCNC: 33.6 G/DL (ref 31.5–35.7)
MCV RBC AUTO: 93.5 FL (ref 79–97)
PLATELET # BLD AUTO: 255 10*3/MM3 (ref 140–450)
PMV BLD AUTO: 10.5 FL (ref 6–12)
POTASSIUM SERPL-SCNC: 4.7 MMOL/L (ref 3.5–5.2)
QT INTERVAL: 404 MS
RBC # BLD AUTO: 3.56 10*6/MM3 (ref 3.77–5.28)
SODIUM SERPL-SCNC: 139 MMOL/L (ref 136–145)
WBC NRBC COR # BLD: 6.3 10*3/MM3 (ref 3.4–10.8)

## 2022-10-10 PROCEDURE — 36415 COLL VENOUS BLD VENIPUNCTURE: CPT

## 2022-10-10 PROCEDURE — 85027 COMPLETE CBC AUTOMATED: CPT

## 2022-10-10 PROCEDURE — 93010 ELECTROCARDIOGRAM REPORT: CPT | Performed by: INTERNAL MEDICINE

## 2022-10-10 PROCEDURE — 93005 ELECTROCARDIOGRAM TRACING: CPT

## 2022-10-10 PROCEDURE — 80048 BASIC METABOLIC PNL TOTAL CA: CPT

## 2022-10-10 RX ORDER — CHLORHEXIDINE GLUCONATE 500 MG/1
1 CLOTH TOPICAL TAKE AS DIRECTED
Status: ON HOLD | COMMUNITY
End: 2022-10-26

## 2022-10-10 RX ORDER — LEVOTHYROXINE SODIUM 0.07 MG/1
75 TABLET ORAL DAILY
COMMUNITY

## 2022-10-10 NOTE — DISCHARGE INSTRUCTIONS
CHLORHEXIDINE CLOTH INSTRUCTIONS  The morning of surgery follow these instructions using the Chlorhexidine cloths you've been given.  These steps reduce bacteria on the body.  Do not use the cloths near your eyes, ears mouth, genitalia or on open wounds.  Throw the cloths away after use but do not try to flush them down a toilet.      Open and remove one cloth at a time from the package.    Leave the cloth unfolded and begin the bathing.  Massage the skin with the cloths using gentle pressure to remove bacteria.  Do not scrub harshly.   Follow the steps below with one 2% CHG cloth per area (6 total cloths).  One cloth for neck, shoulders and chest.  One cloth for both arms, hands, fingers and underarms (do underarms last).  One cloth for the abdomen followed by groin.  One cloth for right leg and foot including between the toes.  One cloth for left leg and foot including between the toes.  The last cloth is to be used for the back of the neck, back and buttocks.    Allow the CHG to air dry 3 minutes on the skin which will give it time to work and decrease the chance of irritation.  The skin may feel sticky until it is dry.  Do not rinse with water or any other liquid or you will lose the beneficial effects of the CHG.  If mild skin irritation occurs, do rinse the skin to remove the CHG.  Report this to the nurse at time of admission.  Do not apply lotions, creams, ointments, deodorants or perfumes after using the clothes. Dress in clean clothes before coming to the hospital.     Take the following medications the morning of surgery:  PREVACID, LYRICA, AND PROPRANOLOL    ARRIVAL TIME 0730 ON 10/26/22      If you are on prescription narcotic pain medication to control your pain you may also take that medication the morning of surgery.    General Instructions:  Do not eat solid food after midnight the night before surgery.  You may drink clear liquids day of surgery but must stop at least one hour before your hospital  arrival time.  It is beneficial for you to have a clear drink that contains carbohydrates the day of surgery.  We suggest a 12 to 20 ounce bottle of Gatorade or Powerade for non-diabetic patients or a 12 to 20 ounce bottle of G2 or Powerade Zero for diabetic patients. (Pediatric patients, are not advised to drink a 12 to 20 ounce carbohydrate drink)    Clear liquids are liquids you can see through.  Nothing red in color.     Plain water                               Sports drinks  Sodas                                   Gelatin (Jell-O)  Fruit juices without pulp such as white grape juice and apple juice  Popsicles that contain no fruit or yogurt  Tea or coffee (no cream or milk added)  Gatorade / Powerade  G2 / Powerade Zero    Infants may have breast milk up to four hours before surgery.  Infants drinking formula may drink formula up to six hours before surgery.   Patients who avoid smoking, chewing tobacco and alcohol for 4 weeks prior to surgery have a reduced risk of post-operative complications.  Quit smoking as many days before surgery as you can.  Do not smoke, use chewing tobacco or drink alcohol the day of surgery.   If applicable bring your C-PAP/ BI-PAP machine.  Bring any papers given to you in the doctor’s office.  Wear clean comfortable clothes.  Do not wear contact lenses, false eyelashes or make-up.  Bring a case for your glasses.   Bring crutches or walker if applicable.  Remove all piercings.  Leave jewelry and any other valuables at home.  Hair extensions with metal clips must be removed prior to surgery.  The Pre-Admission Testing nurse will instruct you to bring medications if unable to obtain an accurate list in Pre-Admission Testing.            Preventing a Surgical Site Infection:  For 2 to 3 days before surgery, avoid shaving with a razor because the razor can irritate skin and make it easier to develop an infection.    Any areas of open skin can increase the risk of a post-operative wound  infection by allowing bacteria to enter and travel throughout the body.  Notify your surgeon if you have any skin wounds / rashes even if it is not near the expected surgical site.  The area will need assessed to determine if surgery should be delayed until it is healed.  The night prior to surgery shower using a fresh bar of anti-bacterial soap (such as Dial) and clean washcloth.  Sleep in a clean bed with clean clothing.  Do not allow pets to sleep with you.  Shower on the morning of surgery using a fresh bar of anti-bacterial soap (such as Dial) and clean washcloth.  Dry with a clean towel and dress in clean clothing.  Ask your surgeon if you will be receiving antibiotics prior to surgery.  Make sure you, your family, and all healthcare providers clean their hands with soap and water or an alcohol based hand  before caring for you or your wound.    Day of surgery:  Your arrival time is approximately two hours before your scheduled surgery time.  Upon arrival, a Pre-op nurse and Anesthesiologist will review your health history, obtain vital signs, and answer questions you may have.  The only belongings needed at this time will be a list of your home medications and if applicable your C-PAP/BI-PAP machine.  A Pre-op nurse will start an IV and you may receive medication in preparation for surgery, including something to help you relax.     Please be aware that surgery does come with discomfort.  We want to make every effort to control your discomfort so please discuss any uncontrolled symptoms with your nurse.   Your doctor will most likely have prescribed pain medications.      If you are going home after surgery you will receive individualized written care instructions before being discharged.  A responsible adult must drive you to and from the hospital on the day of your surgery and stay with you for 24 hours.  Discharge prescriptions can be filled by the hospital pharmacy during regular pharmacy hours.   If you are having surgery late in the day/evening your prescription may be e-prescribed to your pharmacy.  Please verify your pharmacy hours or chose a 24 hour pharmacy to avoid not having access to your prescription because your pharmacy has closed for the day.    If you are staying overnight following surgery, you will be transported to your hospital room following the recovery period.  [facility] has all private rooms.    If you have any questions please call Pre-Admission Testing at [phone].  Deductibles and co-payments are collected on the day of service. Please be prepared to pay the required co-pay, deductible or deposit on the day of service as defined by your plan.    Call your surgeon immediately if you experience any of the following symptoms:  Sore Throat  Shortness of Breath or difficulty breathing  Cough  Chills  Body soreness or muscle pain  Headache  Fever  New loss of taste or smell  Do not arrive for your surgery ill.  Your procedure will need to be rescheduled to another time.  You will need to call your physician before the day of surgery to avoid any unnecessary exposure to hospital staff as well as other patients.

## 2022-10-26 ENCOUNTER — ANESTHESIA EVENT (OUTPATIENT)
Dept: PERIOP | Facility: HOSPITAL | Age: 72
End: 2022-10-26

## 2022-10-26 ENCOUNTER — ANESTHESIA (OUTPATIENT)
Dept: PERIOP | Facility: HOSPITAL | Age: 72
End: 2022-10-26

## 2022-10-26 ENCOUNTER — HOSPITAL ENCOUNTER (OUTPATIENT)
Facility: HOSPITAL | Age: 72
Discharge: HOME | End: 2022-10-29
Attending: NEUROLOGICAL SURGERY | Admitting: NEUROLOGICAL SURGERY

## 2022-10-26 ENCOUNTER — APPOINTMENT (OUTPATIENT)
Dept: GENERAL RADIOLOGY | Facility: HOSPITAL | Age: 72
End: 2022-10-26

## 2022-10-26 DIAGNOSIS — M48.02 CERVICAL SPINAL STENOSIS: ICD-10-CM

## 2022-10-26 LAB — GLUCOSE BLDC GLUCOMTR-MCNC: 193 MG/DL (ref 70–130)

## 2022-10-26 PROCEDURE — 25010000002 FENTANYL CITRATE (PF) 50 MCG/ML SOLUTION: Performed by: STUDENT IN AN ORGANIZED HEALTH CARE EDUCATION/TRAINING PROGRAM

## 2022-10-26 PROCEDURE — A9270 NON-COVERED ITEM OR SERVICE: HCPCS | Performed by: NEUROLOGICAL SURGERY

## 2022-10-26 PROCEDURE — C1713 ANCHOR/SCREW BN/BN,TIS/BN: HCPCS | Performed by: NEUROLOGICAL SURGERY

## 2022-10-26 PROCEDURE — 63710000001 PANTOPRAZOLE 40 MG TABLET DELAYED-RELEASE: Performed by: NEUROLOGICAL SURGERY

## 2022-10-26 PROCEDURE — 63710000001 TRAMADOL 50 MG TABLET: Performed by: NEUROLOGICAL SURGERY

## 2022-10-26 PROCEDURE — 63710000001 GLIPIZIDE 5 MG TABLET: Performed by: NEUROLOGICAL SURGERY

## 2022-10-26 PROCEDURE — 22552 ARTHRD ANT NTRBD CERVICAL EA: CPT | Performed by: NEUROLOGICAL SURGERY

## 2022-10-26 PROCEDURE — 72040 X-RAY EXAM NECK SPINE 2-3 VW: CPT

## 2022-10-26 PROCEDURE — 22845 INSERT SPINE FIXATION DEVICE: CPT | Performed by: SPECIALIST/TECHNOLOGIST, OTHER

## 2022-10-26 PROCEDURE — 25010000002 CEFAZOLIN IN DEXTROSE 2-4 GM/100ML-% SOLUTION: Performed by: NEUROLOGICAL SURGERY

## 2022-10-26 PROCEDURE — 25010000002 PROPOFOL 10 MG/ML EMULSION: Performed by: STUDENT IN AN ORGANIZED HEALTH CARE EDUCATION/TRAINING PROGRAM

## 2022-10-26 PROCEDURE — 82962 GLUCOSE BLOOD TEST: CPT

## 2022-10-26 PROCEDURE — 25010000002 VANCOMYCIN 1 G RECONSTITUTED SOLUTION 1 EACH VIAL: Performed by: NEUROLOGICAL SURGERY

## 2022-10-26 PROCEDURE — 22551 ARTHRD ANT NTRBDY CERVICAL: CPT | Performed by: NEUROLOGICAL SURGERY

## 2022-10-26 PROCEDURE — 25010000002 FENTANYL CITRATE (PF) 100 MCG/2ML SOLUTION: Performed by: STUDENT IN AN ORGANIZED HEALTH CARE EDUCATION/TRAINING PROGRAM

## 2022-10-26 PROCEDURE — 25010000002 SODIUM CHLORIDE 0.9 % WITH KCL 20 MEQ 20-0.9 MEQ/L-% SOLUTION: Performed by: NEUROLOGICAL SURGERY

## 2022-10-26 PROCEDURE — 25010000002 ONDANSETRON PER 1 MG: Performed by: NEUROLOGICAL SURGERY

## 2022-10-26 PROCEDURE — 25010000002 HYDROMORPHONE PER 4 MG: Performed by: STUDENT IN AN ORGANIZED HEALTH CARE EDUCATION/TRAINING PROGRAM

## 2022-10-26 PROCEDURE — 22853 INSJ BIOMECHANICAL DEVICE: CPT | Performed by: SPECIALIST/TECHNOLOGIST, OTHER

## 2022-10-26 PROCEDURE — 22845 INSERT SPINE FIXATION DEVICE: CPT | Performed by: NEUROLOGICAL SURGERY

## 2022-10-26 PROCEDURE — 22552 ARTHRD ANT NTRBD CERVICAL EA: CPT | Performed by: SPECIALIST/TECHNOLOGIST, OTHER

## 2022-10-26 PROCEDURE — 22853 INSJ BIOMECHANICAL DEVICE: CPT | Performed by: NEUROLOGICAL SURGERY

## 2022-10-26 PROCEDURE — 76000 FLUOROSCOPY <1 HR PHYS/QHP: CPT

## 2022-10-26 PROCEDURE — 22551 ARTHRD ANT NTRBDY CERVICAL: CPT | Performed by: SPECIALIST/TECHNOLOGIST, OTHER

## 2022-10-26 PROCEDURE — 63710000001 PREGABALIN 50 MG CAPSULE: Performed by: NEUROLOGICAL SURGERY

## 2022-10-26 PROCEDURE — 63710000001 ALLOPURINOL 100 MG TABLET: Performed by: NEUROLOGICAL SURGERY

## 2022-10-26 PROCEDURE — L0120 CERV FLEX N/ADJ FOAM PRE OTS: HCPCS | Performed by: NEUROLOGICAL SURGERY

## 2022-10-26 PROCEDURE — 25010000002 ONDANSETRON PER 1 MG: Performed by: STUDENT IN AN ORGANIZED HEALTH CARE EDUCATION/TRAINING PROGRAM

## 2022-10-26 PROCEDURE — 63710000001 PROPRANOLOL 20 MG TABLET: Performed by: NEUROLOGICAL SURGERY

## 2022-10-26 DEVICE — FLOSEAL HEMOSTATIC MATRIX, 5ML
Type: IMPLANTABLE DEVICE | Site: SPINE CERVICAL | Status: FUNCTIONAL
Brand: FLOSEAL HEMOSTATIC MATRIX

## 2022-10-26 DEVICE — PLATE 7200045 ATL VISION ELITE 45MM
Type: IMPLANTABLE DEVICE | Site: SPINE CERVICAL | Status: FUNCTIONAL
Brand: ATLANTIS® ANTERIOR CERVICAL PLATE SYSTEM

## 2022-10-26 DEVICE — SSC BONE WAX
Type: IMPLANTABLE DEVICE | Site: SPINE CERVICAL | Status: FUNCTIONAL
Brand: SSC BONE WAX

## 2022-10-26 DEVICE — PUTTY DBF GRAFTON 3CC: Type: IMPLANTABLE DEVICE | Site: SPINE CERVICAL | Status: FUNCTIONAL

## 2022-10-26 DEVICE — INTERBODY FUSION DEVICE NANOLOCK SURFACE TECHNOLOGY 6 DEGREE MEDIUM 7MM
Type: IMPLANTABLE DEVICE | Site: SPINE CERVICAL | Status: FUNCTIONAL
Brand: ENDOSKELETON TC

## 2022-10-26 RX ORDER — SODIUM CHLORIDE, SODIUM LACTATE, POTASSIUM CHLORIDE, CALCIUM CHLORIDE 600; 310; 30; 20 MG/100ML; MG/100ML; MG/100ML; MG/100ML
9 INJECTION, SOLUTION INTRAVENOUS CONTINUOUS
Status: DISCONTINUED | OUTPATIENT
Start: 2022-10-26 | End: 2022-10-26

## 2022-10-26 RX ORDER — FENTANYL CITRATE 50 UG/ML
50 INJECTION, SOLUTION INTRAMUSCULAR; INTRAVENOUS
Status: DISCONTINUED | OUTPATIENT
Start: 2022-10-26 | End: 2022-10-26 | Stop reason: HOSPADM

## 2022-10-26 RX ORDER — IBUPROFEN 600 MG/1
600 TABLET ORAL ONCE AS NEEDED
Status: DISCONTINUED | OUTPATIENT
Start: 2022-10-26 | End: 2022-10-26 | Stop reason: HOSPADM

## 2022-10-26 RX ORDER — DIPHENHYDRAMINE HCL 25 MG
25 CAPSULE ORAL
Status: DISCONTINUED | OUTPATIENT
Start: 2022-10-26 | End: 2022-10-26 | Stop reason: HOSPADM

## 2022-10-26 RX ORDER — CYCLOBENZAPRINE HCL 10 MG
5 TABLET ORAL NIGHTLY PRN
Status: DISCONTINUED | OUTPATIENT
Start: 2022-10-26 | End: 2022-10-27

## 2022-10-26 RX ORDER — ONDANSETRON 4 MG/1
4 TABLET, FILM COATED ORAL EVERY 6 HOURS PRN
Status: DISCONTINUED | OUTPATIENT
Start: 2022-10-26 | End: 2022-10-29 | Stop reason: HOSPADM

## 2022-10-26 RX ORDER — NALOXONE HCL 0.4 MG/ML
0.4 VIAL (ML) INJECTION
Status: DISCONTINUED | OUTPATIENT
Start: 2022-10-26 | End: 2022-10-29 | Stop reason: HOSPADM

## 2022-10-26 RX ORDER — MORPHINE SULFATE 4 MG/ML
2 INJECTION, SOLUTION INTRAMUSCULAR; INTRAVENOUS EVERY 4 HOURS PRN
Status: DISCONTINUED | OUTPATIENT
Start: 2022-10-26 | End: 2022-10-29 | Stop reason: HOSPADM

## 2022-10-26 RX ORDER — ALLOPURINOL 100 MG/1
100 TABLET ORAL NIGHTLY
Status: DISCONTINUED | OUTPATIENT
Start: 2022-10-26 | End: 2022-10-29 | Stop reason: HOSPADM

## 2022-10-26 RX ORDER — SODIUM CHLORIDE 0.9 % (FLUSH) 0.9 %
3-10 SYRINGE (ML) INJECTION AS NEEDED
Status: DISCONTINUED | OUTPATIENT
Start: 2022-10-26 | End: 2022-10-26 | Stop reason: HOSPADM

## 2022-10-26 RX ORDER — ROCURONIUM BROMIDE 10 MG/ML
INJECTION, SOLUTION INTRAVENOUS AS NEEDED
Status: DISCONTINUED | OUTPATIENT
Start: 2022-10-26 | End: 2022-10-26 | Stop reason: SURG

## 2022-10-26 RX ORDER — FENTANYL CITRATE 50 UG/ML
INJECTION, SOLUTION INTRAMUSCULAR; INTRAVENOUS AS NEEDED
Status: DISCONTINUED | OUTPATIENT
Start: 2022-10-26 | End: 2022-10-26 | Stop reason: SURG

## 2022-10-26 RX ORDER — SODIUM CHLORIDE 0.9 % (FLUSH) 0.9 %
10 SYRINGE (ML) INJECTION AS NEEDED
Status: DISCONTINUED | OUTPATIENT
Start: 2022-10-26 | End: 2022-10-29 | Stop reason: HOSPADM

## 2022-10-26 RX ORDER — GLIPIZIDE 5 MG/1
5 TABLET ORAL
Status: DISCONTINUED | OUTPATIENT
Start: 2022-10-26 | End: 2022-10-29 | Stop reason: HOSPADM

## 2022-10-26 RX ORDER — PROPOFOL 10 MG/ML
VIAL (ML) INTRAVENOUS AS NEEDED
Status: DISCONTINUED | OUTPATIENT
Start: 2022-10-26 | End: 2022-10-26 | Stop reason: SURG

## 2022-10-26 RX ORDER — LIDOCAINE HYDROCHLORIDE 10 MG/ML
0.5 INJECTION, SOLUTION EPIDURAL; INFILTRATION; INTRACAUDAL; PERINEURAL ONCE AS NEEDED
Status: DISCONTINUED | OUTPATIENT
Start: 2022-10-26 | End: 2022-10-26 | Stop reason: HOSPADM

## 2022-10-26 RX ORDER — EPHEDRINE SULFATE 50 MG/ML
5 INJECTION, SOLUTION INTRAVENOUS ONCE AS NEEDED
Status: DISCONTINUED | OUTPATIENT
Start: 2022-10-26 | End: 2022-10-26 | Stop reason: HOSPADM

## 2022-10-26 RX ORDER — PROMETHAZINE HYDROCHLORIDE 25 MG/1
25 SUPPOSITORY RECTAL ONCE AS NEEDED
Status: DISCONTINUED | OUTPATIENT
Start: 2022-10-26 | End: 2022-10-26 | Stop reason: HOSPADM

## 2022-10-26 RX ORDER — ONDANSETRON 2 MG/ML
4 INJECTION INTRAMUSCULAR; INTRAVENOUS EVERY 6 HOURS PRN
Status: DISCONTINUED | OUTPATIENT
Start: 2022-10-26 | End: 2022-10-29 | Stop reason: HOSPADM

## 2022-10-26 RX ORDER — HYDRALAZINE HYDROCHLORIDE 20 MG/ML
5 INJECTION INTRAMUSCULAR; INTRAVENOUS
Status: DISCONTINUED | OUTPATIENT
Start: 2022-10-26 | End: 2022-10-26 | Stop reason: HOSPADM

## 2022-10-26 RX ORDER — OXYCODONE AND ACETAMINOPHEN 7.5; 325 MG/1; MG/1
1 TABLET ORAL EVERY 4 HOURS PRN
Status: DISCONTINUED | OUTPATIENT
Start: 2022-10-26 | End: 2022-10-26 | Stop reason: HOSPADM

## 2022-10-26 RX ORDER — ACETAMINOPHEN 500 MG
1000 TABLET ORAL EVERY 6 HOURS PRN
Status: DISCONTINUED | OUTPATIENT
Start: 2022-10-26 | End: 2022-10-29 | Stop reason: HOSPADM

## 2022-10-26 RX ORDER — PROMETHAZINE HYDROCHLORIDE 25 MG/1
25 TABLET ORAL ONCE AS NEEDED
Status: DISCONTINUED | OUTPATIENT
Start: 2022-10-26 | End: 2022-10-26 | Stop reason: HOSPADM

## 2022-10-26 RX ORDER — LEVOTHYROXINE SODIUM 0.03 MG/1
25 TABLET ORAL DAILY
Status: DISCONTINUED | OUTPATIENT
Start: 2022-10-27 | End: 2022-10-29 | Stop reason: HOSPADM

## 2022-10-26 RX ORDER — PROPRANOLOL HYDROCHLORIDE 20 MG/1
40 TABLET ORAL 2 TIMES DAILY
Status: DISCONTINUED | OUTPATIENT
Start: 2022-10-26 | End: 2022-10-29 | Stop reason: HOSPADM

## 2022-10-26 RX ORDER — MIDAZOLAM HYDROCHLORIDE 1 MG/ML
0.5 INJECTION INTRAMUSCULAR; INTRAVENOUS
Status: DISCONTINUED | OUTPATIENT
Start: 2022-10-26 | End: 2022-10-26 | Stop reason: HOSPADM

## 2022-10-26 RX ORDER — ACETAMINOPHEN 500 MG
1000 TABLET ORAL EVERY 6 HOURS PRN
Status: ON HOLD | COMMUNITY
End: 2022-11-06

## 2022-10-26 RX ORDER — PROMETHAZINE HYDROCHLORIDE 12.5 MG/1
12.5 TABLET ORAL EVERY 6 HOURS PRN
Status: DISCONTINUED | OUTPATIENT
Start: 2022-10-26 | End: 2022-10-29 | Stop reason: HOSPADM

## 2022-10-26 RX ORDER — ONDANSETRON 2 MG/ML
INJECTION INTRAMUSCULAR; INTRAVENOUS AS NEEDED
Status: DISCONTINUED | OUTPATIENT
Start: 2022-10-26 | End: 2022-10-26 | Stop reason: SURG

## 2022-10-26 RX ORDER — SODIUM CHLORIDE 0.9 % (FLUSH) 0.9 %
3 SYRINGE (ML) INJECTION EVERY 12 HOURS SCHEDULED
Status: DISCONTINUED | OUTPATIENT
Start: 2022-10-26 | End: 2022-10-26 | Stop reason: HOSPADM

## 2022-10-26 RX ORDER — LABETALOL HYDROCHLORIDE 5 MG/ML
5 INJECTION, SOLUTION INTRAVENOUS
Status: DISCONTINUED | OUTPATIENT
Start: 2022-10-26 | End: 2022-10-26 | Stop reason: HOSPADM

## 2022-10-26 RX ORDER — MAGNESIUM HYDROXIDE 1200 MG/15ML
LIQUID ORAL AS NEEDED
Status: DISCONTINUED | OUTPATIENT
Start: 2022-10-26 | End: 2022-10-26 | Stop reason: HOSPADM

## 2022-10-26 RX ORDER — SODIUM CHLORIDE AND POTASSIUM CHLORIDE 150; 900 MG/100ML; MG/100ML
100 INJECTION, SOLUTION INTRAVENOUS CONTINUOUS
Status: DISCONTINUED | OUTPATIENT
Start: 2022-10-26 | End: 2022-10-27

## 2022-10-26 RX ORDER — CHLORTHALIDONE 25 MG/1
25 TABLET ORAL DAILY
Status: DISCONTINUED | OUTPATIENT
Start: 2022-10-26 | End: 2022-10-29 | Stop reason: HOSPADM

## 2022-10-26 RX ORDER — FLUMAZENIL 0.1 MG/ML
0.2 INJECTION INTRAVENOUS AS NEEDED
Status: DISCONTINUED | OUTPATIENT
Start: 2022-10-26 | End: 2022-10-26 | Stop reason: HOSPADM

## 2022-10-26 RX ORDER — SODIUM CHLORIDE 0.9 % (FLUSH) 0.9 %
10 SYRINGE (ML) INJECTION EVERY 12 HOURS SCHEDULED
Status: DISCONTINUED | OUTPATIENT
Start: 2022-10-26 | End: 2022-10-29 | Stop reason: HOSPADM

## 2022-10-26 RX ORDER — SODIUM CHLORIDE 9 MG/ML
INJECTION, SOLUTION INTRAVENOUS AS NEEDED
Status: DISCONTINUED | OUTPATIENT
Start: 2022-10-26 | End: 2022-10-26 | Stop reason: HOSPADM

## 2022-10-26 RX ORDER — NALOXONE HCL 0.4 MG/ML
0.2 VIAL (ML) INJECTION AS NEEDED
Status: DISCONTINUED | OUTPATIENT
Start: 2022-10-26 | End: 2022-10-26 | Stop reason: HOSPADM

## 2022-10-26 RX ORDER — TRAMADOL HYDROCHLORIDE 50 MG/1
50 TABLET ORAL EVERY 4 HOURS PRN
Status: DISCONTINUED | OUTPATIENT
Start: 2022-10-26 | End: 2022-10-29 | Stop reason: HOSPADM

## 2022-10-26 RX ORDER — SCOLOPAMINE TRANSDERMAL SYSTEM 1 MG/1
1 PATCH, EXTENDED RELEASE TRANSDERMAL ONCE
Status: DISCONTINUED | OUTPATIENT
Start: 2022-10-26 | End: 2022-10-26

## 2022-10-26 RX ORDER — DIPHENHYDRAMINE HYDROCHLORIDE 50 MG/ML
12.5 INJECTION INTRAMUSCULAR; INTRAVENOUS
Status: DISCONTINUED | OUTPATIENT
Start: 2022-10-26 | End: 2022-10-26 | Stop reason: HOSPADM

## 2022-10-26 RX ORDER — HYDROMORPHONE HYDROCHLORIDE 1 MG/ML
0.5 INJECTION, SOLUTION INTRAMUSCULAR; INTRAVENOUS; SUBCUTANEOUS
Status: DISCONTINUED | OUTPATIENT
Start: 2022-10-26 | End: 2022-10-26 | Stop reason: HOSPADM

## 2022-10-26 RX ORDER — ONDANSETRON 2 MG/ML
4 INJECTION INTRAMUSCULAR; INTRAVENOUS ONCE AS NEEDED
Status: DISCONTINUED | OUTPATIENT
Start: 2022-10-26 | End: 2022-10-26 | Stop reason: HOSPADM

## 2022-10-26 RX ORDER — PREGABALIN 50 MG/1
50 CAPSULE ORAL NIGHTLY
Status: DISCONTINUED | OUTPATIENT
Start: 2022-10-26 | End: 2022-10-29 | Stop reason: HOSPADM

## 2022-10-26 RX ORDER — PANTOPRAZOLE SODIUM 40 MG/1
40 TABLET, DELAYED RELEASE ORAL
Status: DISCONTINUED | OUTPATIENT
Start: 2022-10-26 | End: 2022-10-29 | Stop reason: HOSPADM

## 2022-10-26 RX ORDER — HYDROCODONE BITARTRATE AND ACETAMINOPHEN 7.5; 325 MG/1; MG/1
1 TABLET ORAL ONCE AS NEEDED
Status: DISCONTINUED | OUTPATIENT
Start: 2022-10-26 | End: 2022-10-26 | Stop reason: HOSPADM

## 2022-10-26 RX ORDER — FAMOTIDINE 10 MG/ML
20 INJECTION, SOLUTION INTRAVENOUS ONCE
Status: COMPLETED | OUTPATIENT
Start: 2022-10-26 | End: 2022-10-26

## 2022-10-26 RX ORDER — LIDOCAINE HYDROCHLORIDE 20 MG/ML
INJECTION, SOLUTION INFILTRATION; PERINEURAL AS NEEDED
Status: DISCONTINUED | OUTPATIENT
Start: 2022-10-26 | End: 2022-10-26 | Stop reason: SURG

## 2022-10-26 RX ORDER — CEFAZOLIN SODIUM 2 G/100ML
2 INJECTION, SOLUTION INTRAVENOUS ONCE
Status: COMPLETED | OUTPATIENT
Start: 2022-10-26 | End: 2022-10-26

## 2022-10-26 RX ORDER — CEFAZOLIN SODIUM 2 G/100ML
2 INJECTION, SOLUTION INTRAVENOUS EVERY 8 HOURS
Status: COMPLETED | OUTPATIENT
Start: 2022-10-26 | End: 2022-10-27

## 2022-10-26 RX ADMIN — HYDROMORPHONE HYDROCHLORIDE 0.5 MG: 1 INJECTION, SOLUTION INTRAMUSCULAR; INTRAVENOUS; SUBCUTANEOUS at 13:27

## 2022-10-26 RX ADMIN — ONDANSETRON 4 MG: 2 INJECTION INTRAMUSCULAR; INTRAVENOUS at 14:24

## 2022-10-26 RX ADMIN — POTASSIUM CHLORIDE AND SODIUM CHLORIDE 100 ML/HR: 900; 150 INJECTION, SOLUTION INTRAVENOUS at 18:16

## 2022-10-26 RX ADMIN — TRAMADOL HYDROCHLORIDE 50 MG: 50 TABLET, COATED ORAL at 16:47

## 2022-10-26 RX ADMIN — CEFAZOLIN SODIUM 2 G: 2 INJECTION, SOLUTION INTRAVENOUS at 09:44

## 2022-10-26 RX ADMIN — HYDROMORPHONE HYDROCHLORIDE 0.5 MG: 1 INJECTION, SOLUTION INTRAMUSCULAR; INTRAVENOUS; SUBCUTANEOUS at 13:11

## 2022-10-26 RX ADMIN — FENTANYL CITRATE 50 MCG: 50 INJECTION INTRAMUSCULAR; INTRAVENOUS at 13:20

## 2022-10-26 RX ADMIN — ONDANSETRON 4 MG: 2 INJECTION INTRAMUSCULAR; INTRAVENOUS at 12:18

## 2022-10-26 RX ADMIN — FAMOTIDINE 20 MG: 10 INJECTION INTRAVENOUS at 08:39

## 2022-10-26 RX ADMIN — ROCURONIUM BROMIDE 50 MG: 10 INJECTION, SOLUTION INTRAVENOUS at 10:22

## 2022-10-26 RX ADMIN — ALLOPURINOL 100 MG: 100 TABLET ORAL at 21:21

## 2022-10-26 RX ADMIN — PREGABALIN 50 MG: 50 CAPSULE ORAL at 21:21

## 2022-10-26 RX ADMIN — PROPOFOL 150 MG: 10 INJECTION, EMULSION INTRAVENOUS at 10:21

## 2022-10-26 RX ADMIN — LIDOCAINE HYDROCHLORIDE 100 MG: 20 INJECTION, SOLUTION INFILTRATION; PERINEURAL at 10:21

## 2022-10-26 RX ADMIN — FENTANYL CITRATE 25 MCG: 50 INJECTION, SOLUTION INTRAMUSCULAR; INTRAVENOUS at 10:47

## 2022-10-26 RX ADMIN — FENTANYL CITRATE 25 MCG: 50 INJECTION, SOLUTION INTRAMUSCULAR; INTRAVENOUS at 12:39

## 2022-10-26 RX ADMIN — PROPRANOLOL HYDROCHLORIDE 40 MG: 20 TABLET ORAL at 21:21

## 2022-10-26 RX ADMIN — PANTOPRAZOLE SODIUM 40 MG: 40 TABLET, DELAYED RELEASE ORAL at 16:47

## 2022-10-26 RX ADMIN — SODIUM CHLORIDE, POTASSIUM CHLORIDE, SODIUM LACTATE AND CALCIUM CHLORIDE 9 ML/HR: 600; 310; 30; 20 INJECTION, SOLUTION INTRAVENOUS at 08:24

## 2022-10-26 RX ADMIN — GLIPIZIDE 5 MG: 5 TABLET ORAL at 16:47

## 2022-10-26 RX ADMIN — SUGAMMADEX 200 MG: 100 INJECTION, SOLUTION INTRAVENOUS at 12:31

## 2022-10-26 RX ADMIN — FENTANYL CITRATE 25 MCG: 50 INJECTION, SOLUTION INTRAMUSCULAR; INTRAVENOUS at 10:43

## 2022-10-26 RX ADMIN — SCOPALAMINE 1 PATCH: 1 PATCH, EXTENDED RELEASE TRANSDERMAL at 08:39

## 2022-10-26 RX ADMIN — FENTANYL CITRATE 25 MCG: 50 INJECTION, SOLUTION INTRAMUSCULAR; INTRAVENOUS at 12:46

## 2022-10-26 RX ADMIN — FENTANYL CITRATE 50 MCG: 50 INJECTION INTRAMUSCULAR; INTRAVENOUS at 13:09

## 2022-10-26 NOTE — ANESTHESIA POSTPROCEDURE EVALUATION
"Patient: Alejandra Varner    Procedure Summary     Date: 10/26/22 Room / Location: [facility] OR [address] [place] [address] MAIN OR    Anesthesia Start: 1015 Anesthesia Stop: 1247    Procedure: Cervical 5 to cervical 6 and cervical 6 to cervical 7 anterior cervical discectomy, fusion and instrumentation (Spine Cervical) Diagnosis:       Cervical spinal stenosis      (Cervical spinal stenosis [M48.02])    Surgeons: Raciel German MD Provider: Moisés Cagle MD    Anesthesia Type: general ASA Status: 3          Anesthesia Type: general    Vitals  Vitals Value Taken Time   /60 10/26/22 1331   Temp 36.8 °C (98.2 °F) 10/26/22 1244   Pulse 75 10/26/22 1337   Resp 14 10/26/22 1330   SpO2 99 % 10/26/22 1337   Vitals shown include unvalidated device data.        Post Anesthesia Care and Evaluation    Patient location during evaluation: bedside  Patient participation: complete - patient participated  Level of consciousness: awake  Pain management: adequate    Airway patency: patent  Anesthetic complications: No anesthetic complications    Cardiovascular status: acceptable  Respiratory status: acceptable  Hydration status: acceptable    Comments: /60   Pulse 73   Temp 36.8 °C (98.2 °F) (Oral)   Resp 14   Ht 167.6 cm (66\")   Wt 93.1 kg (205 lb 4 oz)   SpO2 99%   BMI 33.13 kg/m²       "

## 2022-10-26 NOTE — ANESTHESIA PROCEDURE NOTES
Airway  Urgency: elective    Airway not difficult    General Information and Staff    Patient location during procedure: OR  Anesthesiologist: Moisés Cagle MD  CRNA/CAA: Abdias Langston CRNA    Indications and Patient Condition  Indications for airway management: airway protection    Preoxygenated: yes  MILS not maintained throughout  Mask difficulty assessment: 1 - vent by mask    Final Airway Details  Final airway type: endotracheal airway      Successful airway: ETT  Cuffed: yes   Successful intubation technique: video laryngoscopy  Endotracheal tube insertion site: oral  Blade: CMAC  Blade size: D  ETT size (mm): 7.0  Cormack-Lehane Classification: grade I - full view of glottis  Placement verified by: capnometry   Cuff volume (mL): 7  Measured from: lips  ETT/EBT  to lips (cm): 21  Number of attempts at approach: 1  Assessment: lips, teeth, and gum same as pre-op and atraumatic intubation

## 2022-10-26 NOTE — ANESTHESIA PREPROCEDURE EVALUATION
Anesthesia Evaluation     Patient summary reviewed and Nursing notes reviewed   history of anesthetic complications: PONV  NPO Solid Status: > 8 hours  NPO Liquid Status: > 6 hours           Airway   Mallampati: II  TM distance: <3 FB  Neck ROM: full  Possible difficult intubation  Dental - normal exam     Pulmonary - normal exam   Cardiovascular - normal exam    ECG reviewed  Rhythm: regular  Rate: normal    (+) hypertension less than 2 medications, hyperlipidemia,       Neuro/Psych  GI/Hepatic/Renal/Endo    (+) obesity,  hiatal hernia, GERD,  renal disease CRI and stones, diabetes mellitus type 2, thyroid problem hypothyroidism    Musculoskeletal     (+) back pain, radiculopathy Right upper extremity      ROS comment: Cervical spinal stenosis  Abdominal   (+) obese,    Substance History      OB/GYN          Other                      Anesthesia Plan    ASA 3     general     (May need CMAC for intubation)  intravenous induction     Anesthetic plan, risks, benefits, and alternatives have been provided, discussed and informed consent has been obtained with: patient.    Plan discussed with CRNA.        CODE STATUS:

## 2022-10-27 ENCOUNTER — APPOINTMENT (OUTPATIENT)
Dept: GENERAL RADIOLOGY | Facility: HOSPITAL | Age: 72
End: 2022-10-27

## 2022-10-27 LAB
BASOPHILS # BLD AUTO: 0.02 10*3/MM3 (ref 0–0.2)
BASOPHILS NFR BLD AUTO: 0.2 % (ref 0–1.5)
DEPRECATED RDW RBC AUTO: 47.9 FL (ref 37–54)
EOSINOPHIL # BLD AUTO: 0.1 10*3/MM3 (ref 0–0.4)
EOSINOPHIL NFR BLD AUTO: 0.8 % (ref 0.3–6.2)
ERYTHROCYTE [DISTWIDTH] IN BLOOD BY AUTOMATED COUNT: 14 % (ref 12.3–15.4)
HCT VFR BLD AUTO: 32.5 % (ref 34–46.6)
HGB BLD-MCNC: 10.8 G/DL (ref 12–15.9)
IMM GRANULOCYTES # BLD AUTO: 0.06 10*3/MM3 (ref 0–0.05)
IMM GRANULOCYTES NFR BLD AUTO: 0.5 % (ref 0–0.5)
LYMPHOCYTES # BLD AUTO: 1.83 10*3/MM3 (ref 0.7–3.1)
LYMPHOCYTES NFR BLD AUTO: 14.4 % (ref 19.6–45.3)
MCH RBC QN AUTO: 31.3 PG (ref 26.6–33)
MCHC RBC AUTO-ENTMCNC: 33.2 G/DL (ref 31.5–35.7)
MCV RBC AUTO: 94.2 FL (ref 79–97)
MONOCYTES # BLD AUTO: 0.84 10*3/MM3 (ref 0.1–0.9)
MONOCYTES NFR BLD AUTO: 6.6 % (ref 5–12)
NEUTROPHILS NFR BLD AUTO: 77.5 % (ref 42.7–76)
NEUTROPHILS NFR BLD AUTO: 9.89 10*3/MM3 (ref 1.7–7)
NRBC BLD AUTO-RTO: 0 /100 WBC (ref 0–0.2)
PLATELET # BLD AUTO: 225 10*3/MM3 (ref 140–450)
PMV BLD AUTO: 11.1 FL (ref 6–12)
RBC # BLD AUTO: 3.45 10*6/MM3 (ref 3.77–5.28)
WBC NRBC COR # BLD: 12.74 10*3/MM3 (ref 3.4–10.8)

## 2022-10-27 PROCEDURE — 63710000001 PANTOPRAZOLE 40 MG TABLET DELAYED-RELEASE: Performed by: NEUROLOGICAL SURGERY

## 2022-10-27 PROCEDURE — 85025 COMPLETE CBC W/AUTO DIFF WBC: CPT | Performed by: NEUROLOGICAL SURGERY

## 2022-10-27 PROCEDURE — 63710000001 LEVOTHYROXINE 25 MCG TABLET: Performed by: NEUROLOGICAL SURGERY

## 2022-10-27 PROCEDURE — A9270 NON-COVERED ITEM OR SERVICE: HCPCS | Performed by: NEUROLOGICAL SURGERY

## 2022-10-27 PROCEDURE — 63710000001 MAGNESIUM OXIDE 400 (240 MG) MG TABLET: Performed by: NEUROLOGICAL SURGERY

## 2022-10-27 PROCEDURE — 0 HYDROCORTISONE SOD SUC (PF) 250 MG RECONSTITUTED SOLUTION: Performed by: NURSE PRACTITIONER

## 2022-10-27 PROCEDURE — 63710000001 PREGABALIN 50 MG CAPSULE: Performed by: NEUROLOGICAL SURGERY

## 2022-10-27 PROCEDURE — 72040 X-RAY EXAM NECK SPINE 2-3 VW: CPT

## 2022-10-27 PROCEDURE — 63710000001 CHLORTHALIDONE 25 MG TABLET: Performed by: NEUROLOGICAL SURGERY

## 2022-10-27 PROCEDURE — 99024 POSTOP FOLLOW-UP VISIT: CPT | Performed by: NURSE PRACTITIONER

## 2022-10-27 PROCEDURE — 63710000001 TRAMADOL 50 MG TABLET: Performed by: NEUROLOGICAL SURGERY

## 2022-10-27 PROCEDURE — 25010000002 CEFAZOLIN IN DEXTROSE 2-4 GM/100ML-% SOLUTION: Performed by: NEUROLOGICAL SURGERY

## 2022-10-27 PROCEDURE — 63710000001 ALLOPURINOL 100 MG TABLET: Performed by: NEUROLOGICAL SURGERY

## 2022-10-27 PROCEDURE — 63710000001 PROPRANOLOL 20 MG TABLET: Performed by: NEUROLOGICAL SURGERY

## 2022-10-27 PROCEDURE — 63710000001 GLIPIZIDE 5 MG TABLET: Performed by: NEUROLOGICAL SURGERY

## 2022-10-27 RX ORDER — CYCLOBENZAPRINE HCL 10 MG
5 TABLET ORAL 3 TIMES DAILY PRN
Status: DISCONTINUED | OUTPATIENT
Start: 2022-10-27 | End: 2022-10-29 | Stop reason: HOSPADM

## 2022-10-27 RX ADMIN — MAGNESIUM OXIDE 400 MG (241.3 MG MAGNESIUM) TABLET 400 MG: TABLET at 08:44

## 2022-10-27 RX ADMIN — TRAMADOL HYDROCHLORIDE 50 MG: 50 TABLET, COATED ORAL at 08:48

## 2022-10-27 RX ADMIN — ALLOPURINOL 100 MG: 100 TABLET ORAL at 20:35

## 2022-10-27 RX ADMIN — TRAMADOL HYDROCHLORIDE 50 MG: 50 TABLET, COATED ORAL at 18:02

## 2022-10-27 RX ADMIN — GLIPIZIDE 5 MG: 5 TABLET ORAL at 08:44

## 2022-10-27 RX ADMIN — HYDROCORTISONE SODIUM SUCCINATE 250 MG: 250 INJECTION, POWDER, FOR SOLUTION INTRAMUSCULAR; INTRAVENOUS at 12:02

## 2022-10-27 RX ADMIN — GLIPIZIDE 5 MG: 5 TABLET ORAL at 16:38

## 2022-10-27 RX ADMIN — MAGNESIUM OXIDE 400 MG (241.3 MG MAGNESIUM) TABLET 400 MG: TABLET at 20:35

## 2022-10-27 RX ADMIN — PROPRANOLOL HYDROCHLORIDE 40 MG: 20 TABLET ORAL at 08:44

## 2022-10-27 RX ADMIN — HYDROCORTISONE SODIUM SUCCINATE 250 MG: 250 INJECTION, POWDER, FOR SOLUTION INTRAMUSCULAR; INTRAVENOUS at 18:01

## 2022-10-27 RX ADMIN — PANTOPRAZOLE SODIUM 40 MG: 40 TABLET, DELAYED RELEASE ORAL at 16:38

## 2022-10-27 RX ADMIN — PROPRANOLOL HYDROCHLORIDE 40 MG: 20 TABLET ORAL at 20:36

## 2022-10-27 RX ADMIN — LEVOTHYROXINE SODIUM 25 MCG: 0.03 TABLET ORAL at 06:51

## 2022-10-27 RX ADMIN — Medication 10 ML: at 20:36

## 2022-10-27 RX ADMIN — CEFAZOLIN SODIUM 2 G: 2 INJECTION, SOLUTION INTRAVENOUS at 03:56

## 2022-10-27 RX ADMIN — TRAMADOL HYDROCHLORIDE 50 MG: 50 TABLET, COATED ORAL at 01:53

## 2022-10-27 RX ADMIN — PREGABALIN 50 MG: 50 CAPSULE ORAL at 20:35

## 2022-10-27 RX ADMIN — CEFAZOLIN SODIUM 2 G: 2 INJECTION, SOLUTION INTRAVENOUS at 00:29

## 2022-10-27 RX ADMIN — CHLORTHALIDONE 25 MG: 25 TABLET ORAL at 08:44

## 2022-10-27 RX ADMIN — PANTOPRAZOLE SODIUM 40 MG: 40 TABLET, DELAYED RELEASE ORAL at 06:51

## 2022-10-28 LAB
DEPRECATED RDW RBC AUTO: 44.3 FL (ref 37–54)
ERYTHROCYTE [DISTWIDTH] IN BLOOD BY AUTOMATED COUNT: 13.3 % (ref 12.3–15.4)
HCT VFR BLD AUTO: 31.2 % (ref 34–46.6)
HGB BLD-MCNC: 10.8 G/DL (ref 12–15.9)
MCH RBC QN AUTO: 31.5 PG (ref 26.6–33)
MCHC RBC AUTO-ENTMCNC: 34.6 G/DL (ref 31.5–35.7)
MCV RBC AUTO: 91 FL (ref 79–97)
PLATELET # BLD AUTO: 252 10*3/MM3 (ref 140–450)
PMV BLD AUTO: 10.2 FL (ref 6–12)
RBC # BLD AUTO: 3.43 10*6/MM3 (ref 3.77–5.28)
WBC NRBC COR # BLD: 13.43 10*3/MM3 (ref 3.4–10.8)

## 2022-10-28 PROCEDURE — 0 HYDROCORTISONE SOD SUC (PF) 250 MG RECONSTITUTED SOLUTION: Performed by: NURSE PRACTITIONER

## 2022-10-28 PROCEDURE — 63710000001 LEVOTHYROXINE 25 MCG TABLET: Performed by: NEUROLOGICAL SURGERY

## 2022-10-28 PROCEDURE — 63710000001 TRAMADOL 50 MG TABLET: Performed by: NEUROLOGICAL SURGERY

## 2022-10-28 PROCEDURE — A9270 NON-COVERED ITEM OR SERVICE: HCPCS | Performed by: NEUROLOGICAL SURGERY

## 2022-10-28 PROCEDURE — 63710000001 MAGNESIUM OXIDE 400 (240 MG) MG TABLET: Performed by: NEUROLOGICAL SURGERY

## 2022-10-28 PROCEDURE — 63710000001 ALLOPURINOL 100 MG TABLET: Performed by: NEUROLOGICAL SURGERY

## 2022-10-28 PROCEDURE — 85027 COMPLETE CBC AUTOMATED: CPT | Performed by: NURSE PRACTITIONER

## 2022-10-28 PROCEDURE — 63710000001 PANTOPRAZOLE 40 MG TABLET DELAYED-RELEASE: Performed by: NEUROLOGICAL SURGERY

## 2022-10-28 PROCEDURE — 63710000001 GLIPIZIDE 5 MG TABLET: Performed by: NEUROLOGICAL SURGERY

## 2022-10-28 PROCEDURE — 63710000001 PROPRANOLOL 20 MG TABLET: Performed by: NEUROLOGICAL SURGERY

## 2022-10-28 PROCEDURE — 99024 POSTOP FOLLOW-UP VISIT: CPT | Performed by: NURSE PRACTITIONER

## 2022-10-28 PROCEDURE — 63710000001 PREGABALIN 50 MG CAPSULE: Performed by: NEUROLOGICAL SURGERY

## 2022-10-28 PROCEDURE — 63710000001 CHLORTHALIDONE 25 MG TABLET: Performed by: NEUROLOGICAL SURGERY

## 2022-10-28 RX ADMIN — GLIPIZIDE 5 MG: 5 TABLET ORAL at 17:35

## 2022-10-28 RX ADMIN — MAGNESIUM OXIDE 400 MG (241.3 MG MAGNESIUM) TABLET 400 MG: TABLET at 21:30

## 2022-10-28 RX ADMIN — PROPRANOLOL HYDROCHLORIDE 40 MG: 20 TABLET ORAL at 08:45

## 2022-10-28 RX ADMIN — HYDROCORTISONE SODIUM SUCCINATE 250 MG: 250 INJECTION, POWDER, FOR SOLUTION INTRAMUSCULAR; INTRAVENOUS at 05:29

## 2022-10-28 RX ADMIN — PANTOPRAZOLE SODIUM 40 MG: 40 TABLET, DELAYED RELEASE ORAL at 17:35

## 2022-10-28 RX ADMIN — LEVOTHYROXINE SODIUM 25 MCG: 0.03 TABLET ORAL at 05:29

## 2022-10-28 RX ADMIN — TRAMADOL HYDROCHLORIDE 50 MG: 50 TABLET, COATED ORAL at 08:46

## 2022-10-28 RX ADMIN — ALLOPURINOL 100 MG: 100 TABLET ORAL at 21:30

## 2022-10-28 RX ADMIN — GLIPIZIDE 5 MG: 5 TABLET ORAL at 07:50

## 2022-10-28 RX ADMIN — HYDROCORTISONE SODIUM SUCCINATE 250 MG: 250 INJECTION, POWDER, FOR SOLUTION INTRAMUSCULAR; INTRAVENOUS at 00:26

## 2022-10-28 RX ADMIN — MAGNESIUM OXIDE 400 MG (241.3 MG MAGNESIUM) TABLET 400 MG: TABLET at 08:46

## 2022-10-28 RX ADMIN — TRAMADOL HYDROCHLORIDE 50 MG: 50 TABLET, COATED ORAL at 21:30

## 2022-10-28 RX ADMIN — PREGABALIN 50 MG: 50 CAPSULE ORAL at 21:30

## 2022-10-28 RX ADMIN — Medication 10 ML: at 08:46

## 2022-10-28 RX ADMIN — PANTOPRAZOLE SODIUM 40 MG: 40 TABLET, DELAYED RELEASE ORAL at 07:50

## 2022-10-28 RX ADMIN — Medication 10 ML: at 21:31

## 2022-10-28 RX ADMIN — CHLORTHALIDONE 25 MG: 25 TABLET ORAL at 08:45

## 2022-10-29 VITALS
HEART RATE: 70 BPM | DIASTOLIC BLOOD PRESSURE: 69 MMHG | WEIGHT: 205.25 LBS | BODY MASS INDEX: 32.99 KG/M2 | HEIGHT: 66 IN | TEMPERATURE: 98.2 F | OXYGEN SATURATION: 95 % | SYSTOLIC BLOOD PRESSURE: 114 MMHG | RESPIRATION RATE: 17 BRPM

## 2022-10-29 LAB
DEPRECATED RDW RBC AUTO: 50.3 FL (ref 37–54)
ERYTHROCYTE [DISTWIDTH] IN BLOOD BY AUTOMATED COUNT: 14.3 % (ref 12.3–15.4)
HCT VFR BLD AUTO: 29.3 % (ref 34–46.6)
HGB BLD-MCNC: 9.8 G/DL (ref 12–15.9)
MCH RBC QN AUTO: 31.8 PG (ref 26.6–33)
MCHC RBC AUTO-ENTMCNC: 33.4 G/DL (ref 31.5–35.7)
MCV RBC AUTO: 95.1 FL (ref 79–97)
PLATELET # BLD AUTO: 210 10*3/MM3 (ref 140–450)
PMV BLD AUTO: 10.4 FL (ref 6–12)
RBC # BLD AUTO: 3.08 10*6/MM3 (ref 3.77–5.28)
WBC NRBC COR # BLD: 10.9 10*3/MM3 (ref 3.4–10.8)

## 2022-10-29 PROCEDURE — A9270 NON-COVERED ITEM OR SERVICE: HCPCS | Performed by: NEUROLOGICAL SURGERY

## 2022-10-29 PROCEDURE — 63710000001 PROPRANOLOL 20 MG TABLET: Performed by: NEUROLOGICAL SURGERY

## 2022-10-29 PROCEDURE — 63710000001 TRAMADOL 50 MG TABLET: Performed by: NEUROLOGICAL SURGERY

## 2022-10-29 PROCEDURE — 85027 COMPLETE CBC AUTOMATED: CPT | Performed by: NURSE PRACTITIONER

## 2022-10-29 PROCEDURE — 63710000001 MAGNESIUM OXIDE 400 (240 MG) MG TABLET: Performed by: NEUROLOGICAL SURGERY

## 2022-10-29 PROCEDURE — 99024 POSTOP FOLLOW-UP VISIT: CPT | Performed by: PHYSICIAN ASSISTANT

## 2022-10-29 PROCEDURE — 63710000001 LEVOTHYROXINE 25 MCG TABLET: Performed by: NEUROLOGICAL SURGERY

## 2022-10-29 PROCEDURE — 63710000001 GLIPIZIDE 5 MG TABLET: Performed by: NEUROLOGICAL SURGERY

## 2022-10-29 PROCEDURE — 63710000001 CHLORTHALIDONE 25 MG TABLET: Performed by: NEUROLOGICAL SURGERY

## 2022-10-29 PROCEDURE — 63710000001 PANTOPRAZOLE 40 MG TABLET DELAYED-RELEASE: Performed by: NEUROLOGICAL SURGERY

## 2022-10-29 RX ADMIN — PANTOPRAZOLE SODIUM 40 MG: 40 TABLET, DELAYED RELEASE ORAL at 06:45

## 2022-10-29 RX ADMIN — TRAMADOL HYDROCHLORIDE 50 MG: 50 TABLET, COATED ORAL at 06:50

## 2022-10-29 RX ADMIN — Medication 10 ML: at 08:36

## 2022-10-29 RX ADMIN — GLIPIZIDE 5 MG: 5 TABLET ORAL at 07:42

## 2022-10-29 RX ADMIN — PROPRANOLOL HYDROCHLORIDE 40 MG: 20 TABLET ORAL at 08:36

## 2022-10-29 RX ADMIN — CHLORTHALIDONE 25 MG: 25 TABLET ORAL at 08:36

## 2022-10-29 RX ADMIN — LEVOTHYROXINE SODIUM 25 MCG: 0.03 TABLET ORAL at 06:45

## 2022-10-29 RX ADMIN — TRAMADOL HYDROCHLORIDE 50 MG: 50 TABLET, COATED ORAL at 11:29

## 2022-10-29 RX ADMIN — MAGNESIUM OXIDE 400 MG (241.3 MG MAGNESIUM) TABLET 400 MG: TABLET at 08:37

## 2022-10-31 DIAGNOSIS — M54.50 ACUTE MIDLINE LOW BACK PAIN WITHOUT SCIATICA: Primary | ICD-10-CM

## 2022-10-31 DIAGNOSIS — M48.02 CERVICAL SPINAL STENOSIS: ICD-10-CM

## 2022-11-01 ENCOUNTER — TELEPHONE (OUTPATIENT)
Dept: NEUROSURGERY | Facility: CLINIC | Age: 72
End: 2022-11-01

## 2022-11-06 ENCOUNTER — HOSPITAL ENCOUNTER (INPATIENT)
Facility: HOSPITAL | Age: 72
LOS: 5 days | Discharge: HOME | End: 2022-11-11
Attending: STUDENT IN AN ORGANIZED HEALTH CARE EDUCATION/TRAINING PROGRAM | Admitting: STUDENT IN AN ORGANIZED HEALTH CARE EDUCATION/TRAINING PROGRAM

## 2022-11-06 DIAGNOSIS — Z09 FOLLOW-UP EXAM: ICD-10-CM

## 2022-11-06 DIAGNOSIS — I26.99 BILATERAL PULMONARY EMBOLISM: Primary | ICD-10-CM

## 2022-11-06 PROBLEM — N18.31 STAGE 3A CHRONIC KIDNEY DISEASE: Status: ACTIVE | Noted: 2022-11-06

## 2022-11-06 PROBLEM — E03.9 HYPOTHYROIDISM (ACQUIRED): Chronic | Status: ACTIVE | Noted: 2022-11-06

## 2022-11-06 PROBLEM — I10 ESSENTIAL HYPERTENSION: Chronic | Status: ACTIVE | Noted: 2022-11-06

## 2022-11-06 PROBLEM — E78.2 MIXED HYPERLIPIDEMIA: Chronic | Status: ACTIVE | Noted: 2022-11-06

## 2022-11-06 LAB — GLUCOSE BLDC GLUCOMTR-MCNC: 160 MG/DL (ref 70–130)

## 2022-11-06 PROCEDURE — 25010000002 HEPARIN (PORCINE) 25000-0.45 UT/250ML-% SOLUTION: Performed by: NURSE PRACTITIONER

## 2022-11-06 PROCEDURE — 85025 COMPLETE CBC W/AUTO DIFF WBC: CPT | Performed by: NURSE PRACTITIONER

## 2022-11-06 PROCEDURE — 85730 THROMBOPLASTIN TIME PARTIAL: CPT | Performed by: NURSE PRACTITIONER

## 2022-11-06 PROCEDURE — 82962 GLUCOSE BLOOD TEST: CPT

## 2022-11-06 PROCEDURE — 93005 ELECTROCARDIOGRAM TRACING: CPT | Performed by: NURSE PRACTITIONER

## 2022-11-06 PROCEDURE — 85610 PROTHROMBIN TIME: CPT | Performed by: NURSE PRACTITIONER

## 2022-11-06 PROCEDURE — 93010 ELECTROCARDIOGRAM REPORT: CPT | Performed by: STUDENT IN AN ORGANIZED HEALTH CARE EDUCATION/TRAINING PROGRAM

## 2022-11-06 RX ORDER — SODIUM CHLORIDE 0.9 % (FLUSH) 0.9 %
10 SYRINGE (ML) INJECTION EVERY 12 HOURS SCHEDULED
Status: DISCONTINUED | OUTPATIENT
Start: 2022-11-06 | End: 2022-11-11 | Stop reason: HOSPADM

## 2022-11-06 RX ORDER — HEPARIN SODIUM 10000 [USP'U]/100ML
12 INJECTION, SOLUTION INTRAVENOUS
Status: DISCONTINUED | OUTPATIENT
Start: 2022-11-06 | End: 2022-11-07

## 2022-11-06 RX ORDER — PANTOPRAZOLE SODIUM 40 MG/1
40 TABLET, DELAYED RELEASE ORAL EVERY MORNING
Status: DISCONTINUED | OUTPATIENT
Start: 2022-11-07 | End: 2022-11-11 | Stop reason: HOSPADM

## 2022-11-06 RX ORDER — LEVOTHYROXINE SODIUM 0.07 MG/1
75 TABLET ORAL DAILY
Status: DISCONTINUED | OUTPATIENT
Start: 2022-11-07 | End: 2022-11-11 | Stop reason: HOSPADM

## 2022-11-06 RX ORDER — HEPARIN SODIUM 5000 [USP'U]/ML
40-80 INJECTION, SOLUTION INTRAVENOUS; SUBCUTANEOUS EVERY 6 HOURS PRN
Status: DISCONTINUED | OUTPATIENT
Start: 2022-11-06 | End: 2022-11-07

## 2022-11-06 RX ORDER — TRAMADOL HYDROCHLORIDE 50 MG/1
50 TABLET ORAL 2 TIMES DAILY PRN
Status: DISCONTINUED | OUTPATIENT
Start: 2022-11-06 | End: 2022-11-11 | Stop reason: HOSPADM

## 2022-11-06 RX ORDER — PREGABALIN 50 MG/1
50 CAPSULE ORAL NIGHTLY
Status: DISCONTINUED | OUTPATIENT
Start: 2022-11-07 | End: 2022-11-11 | Stop reason: HOSPADM

## 2022-11-06 RX ORDER — NITROGLYCERIN 0.4 MG/1
0.4 TABLET SUBLINGUAL
Status: DISCONTINUED | OUTPATIENT
Start: 2022-11-06 | End: 2022-11-09

## 2022-11-06 RX ORDER — DEXTROSE MONOHYDRATE 25 G/50ML
25 INJECTION, SOLUTION INTRAVENOUS
Status: DISCONTINUED | OUTPATIENT
Start: 2022-11-06 | End: 2022-11-10

## 2022-11-06 RX ORDER — NICOTINE POLACRILEX 4 MG
15 LOZENGE BUCCAL
Status: DISCONTINUED | OUTPATIENT
Start: 2022-11-06 | End: 2022-11-10

## 2022-11-06 RX ORDER — CYCLOBENZAPRINE HCL 10 MG
5 TABLET ORAL NIGHTLY PRN
Status: DISCONTINUED | OUTPATIENT
Start: 2022-11-06 | End: 2022-11-11 | Stop reason: HOSPADM

## 2022-11-06 RX ORDER — INSULIN LISPRO 100 [IU]/ML
0-7 INJECTION, SOLUTION INTRAVENOUS; SUBCUTANEOUS
Status: DISCONTINUED | OUTPATIENT
Start: 2022-11-07 | End: 2022-11-07

## 2022-11-06 RX ORDER — SODIUM CHLORIDE 0.9 % (FLUSH) 0.9 %
10 SYRINGE (ML) INJECTION AS NEEDED
Status: DISCONTINUED | OUTPATIENT
Start: 2022-11-06 | End: 2022-11-11 | Stop reason: HOSPADM

## 2022-11-06 RX ADMIN — HEPARIN SODIUM 22 UNITS/KG/HR: 10000 INJECTION, SOLUTION INTRAVENOUS at 22:49

## 2022-11-06 RX ADMIN — Medication 10 ML: at 23:53

## 2022-11-06 RX ADMIN — PREGABALIN 50 MG: 50 CAPSULE ORAL at 23:53

## 2022-11-07 ENCOUNTER — APPOINTMENT (OUTPATIENT)
Dept: OTHER | Facility: HOSPITAL | Age: 72
End: 2022-11-07

## 2022-11-07 ENCOUNTER — APPOINTMENT (OUTPATIENT)
Dept: CARDIOLOGY | Facility: HOSPITAL | Age: 72
End: 2022-11-07

## 2022-11-07 PROBLEM — R00.2 PALPITATIONS: Status: RESOLVED | Noted: 2018-08-21 | Resolved: 2022-11-07

## 2022-11-07 PROBLEM — R07.89 CHEST PAIN, ATYPICAL: Status: ACTIVE | Noted: 2022-11-07

## 2022-11-07 LAB
ACT BLD: 190 SECONDS (ref 82–152)
ALBUMIN SERPL-MCNC: 3.3 G/DL (ref 3.5–5.2)
ALBUMIN/GLOB SERPL: 0.9 G/DL
ALP SERPL-CCNC: 100 U/L (ref 39–117)
ALT SERPL W P-5'-P-CCNC: 18 U/L (ref 1–33)
ANION GAP SERPL CALCULATED.3IONS-SCNC: 17.6 MMOL/L (ref 5–15)
AORTIC DIMENSIONLESS INDEX: 0.7 (DI)
APTT PPP: 59.8 SECONDS (ref 22.7–35.4)
APTT PPP: 87.5 SECONDS (ref 22.7–35.4)
APTT PPP: >200 SECONDS (ref 22.7–35.4)
AST SERPL-CCNC: 29 U/L (ref 1–32)
BASOPHILS # BLD AUTO: 0.03 10*3/MM3 (ref 0–0.2)
BASOPHILS # BLD AUTO: 0.05 10*3/MM3 (ref 0–0.2)
BASOPHILS NFR BLD AUTO: 0.3 % (ref 0–1.5)
BASOPHILS NFR BLD AUTO: 0.5 % (ref 0–1.5)
BH CV ECHO MEAS - AO MAX PG: 12.5 MMHG
BH CV ECHO MEAS - AO MEAN PG: 5.7 MMHG
BH CV ECHO MEAS - AO ROOT DIAM: 2.9 CM
BH CV ECHO MEAS - AO V2 MAX: 176.5 CM/SEC
BH CV ECHO MEAS - AO V2 VTI: 29.2 CM
BH CV ECHO MEAS - AVA(I,D): 1.62 CM2
BH CV ECHO MEAS - EDV(CUBED): 41.1 ML
BH CV ECHO MEAS - ESV(CUBED): 10.3 ML
BH CV ECHO MEAS - FS: 36.9 %
BH CV ECHO MEAS - IVS/LVPW: 1.05 CM
BH CV ECHO MEAS - IVSD: 1.13 CM
BH CV ECHO MEAS - LAT PEAK E' VEL: 8.4 CM/SEC
BH CV ECHO MEAS - LV MASS(C)D: 117.5 GRAMS
BH CV ECHO MEAS - LV MAX PG: 5.2 MMHG
BH CV ECHO MEAS - LV MEAN PG: 2.32 MMHG
BH CV ECHO MEAS - LV V1 MAX: 113.8 CM/SEC
BH CV ECHO MEAS - LV V1 VTI: 20.2 CM
BH CV ECHO MEAS - LVIDD: 3.5 CM
BH CV ECHO MEAS - LVIDS: 2.18 CM
BH CV ECHO MEAS - LVOT AREA: 2.34 CM2
BH CV ECHO MEAS - LVOT DIAM: 1.73 CM
BH CV ECHO MEAS - LVPWD: 1.08 CM
BH CV ECHO MEAS - MED PEAK E' VEL: 6 CM/SEC
BH CV ECHO MEAS - MV A DUR: 0.09 SEC
BH CV ECHO MEAS - MV A MAX VEL: 78.4 CM/SEC
BH CV ECHO MEAS - MV DEC SLOPE: 272.4 CM/SEC2
BH CV ECHO MEAS - MV DEC TIME: 215 MSEC
BH CV ECHO MEAS - MV E MAX VEL: 58.3 CM/SEC
BH CV ECHO MEAS - MV E/A: 0.74
BH CV ECHO MEAS - MV MAX PG: 5 MMHG
BH CV ECHO MEAS - MV MEAN PG: 1.6 MMHG
BH CV ECHO MEAS - MV P1/2T: 79.2 MSEC
BH CV ECHO MEAS - MV V2 VTI: 22.7 CM
BH CV ECHO MEAS - MVA(P1/2T): 2.8 CM2
BH CV ECHO MEAS - MVA(VTI): 2.08 CM2
BH CV ECHO MEAS - PA ACC TIME: 0.11 SEC
BH CV ECHO MEAS - PA PR(ACCEL): 27.9 MMHG
BH CV ECHO MEAS - PA V2 MAX: 62.4 CM/SEC
BH CV ECHO MEAS - RAP SYSTOLE: 8 MMHG
BH CV ECHO MEAS - RV MAX PG: 0.49 MMHG
BH CV ECHO MEAS - RV V1 MAX: 35.1 CM/SEC
BH CV ECHO MEAS - RV V1 VTI: 7.4 CM
BH CV ECHO MEAS - RVSP: 60 MMHG
BH CV ECHO MEAS - SV(LVOT): 47.3 ML
BH CV ECHO MEAS - TAPSE (>1.6): 1.3 CM
BH CV ECHO MEAS - TR MAX PG: 52 MMHG
BH CV ECHO MEAS - TR MAX VEL: 359 CM/SEC
BH CV ECHO MEAS RV FREE WALL STRAIN: -9.9 %
BH CV ECHO MEASUREMENTS AVERAGE E/E' RATIO: 8.1
BH CV XLRA - RV BASE: 3.4 CM
BH CV XLRA - RV LENGTH: 7.4 CM
BH CV XLRA - RV MID: 4.6 CM
BH CV XLRA - TDI S': 10.8 CM/SEC
BILIRUB SERPL-MCNC: 0.5 MG/DL (ref 0–1.2)
BUN SERPL-MCNC: 37 MG/DL (ref 8–23)
BUN/CREAT SERPL: 16.8 (ref 7–25)
CALCIUM SPEC-SCNC: 9 MG/DL (ref 8.6–10.5)
CHLORIDE SERPL-SCNC: 96 MMOL/L (ref 98–107)
CO2 SERPL-SCNC: 17.4 MMOL/L (ref 22–29)
CREAT SERPL-MCNC: 2.2 MG/DL (ref 0.57–1)
D-LACTATE SERPL-SCNC: 1.4 MMOL/L (ref 0.5–2)
D-LACTATE SERPL-SCNC: 4.9 MMOL/L (ref 0.5–2)
DEPRECATED RDW RBC AUTO: 46.5 FL (ref 37–54)
DEPRECATED RDW RBC AUTO: 48 FL (ref 37–54)
EGFRCR SERPLBLD CKD-EPI 2021: 23.1 ML/MIN/1.73
EOSINOPHIL # BLD AUTO: 0.06 10*3/MM3 (ref 0–0.4)
EOSINOPHIL # BLD AUTO: 0.19 10*3/MM3 (ref 0–0.4)
EOSINOPHIL NFR BLD AUTO: 0.6 % (ref 0.3–6.2)
EOSINOPHIL NFR BLD AUTO: 1.7 % (ref 0.3–6.2)
ERYTHROCYTE [DISTWIDTH] IN BLOOD BY AUTOMATED COUNT: 13.4 % (ref 12.3–15.4)
ERYTHROCYTE [DISTWIDTH] IN BLOOD BY AUTOMATED COUNT: 13.5 % (ref 12.3–15.4)
F5 GENE MUT ANL BLD/T: NORMAL
GLOBULIN UR ELPH-MCNC: 3.6 GM/DL
GLUCOSE BLDC GLUCOMTR-MCNC: 133 MG/DL (ref 70–130)
GLUCOSE BLDC GLUCOMTR-MCNC: 134 MG/DL (ref 70–130)
GLUCOSE BLDC GLUCOMTR-MCNC: 141 MG/DL (ref 70–130)
GLUCOSE BLDC GLUCOMTR-MCNC: 99 MG/DL (ref 70–130)
GLUCOSE SERPL-MCNC: 106 MG/DL (ref 65–99)
HCT VFR BLD AUTO: 29.1 % (ref 34–46.6)
HCT VFR BLD AUTO: 31.3 % (ref 34–46.6)
HCT VFR BLDA CALC: 28 % (ref 38–51)
HGB BLD-MCNC: 10.2 G/DL (ref 12–15.9)
HGB BLD-MCNC: 9.9 G/DL (ref 12–15.9)
HGB BLDA-MCNC: 9.5 G/DL (ref 12–17)
IMM GRANULOCYTES # BLD AUTO: 0.07 10*3/MM3 (ref 0–0.05)
IMM GRANULOCYTES # BLD AUTO: 0.07 10*3/MM3 (ref 0–0.05)
IMM GRANULOCYTES NFR BLD AUTO: 0.6 % (ref 0–0.5)
IMM GRANULOCYTES NFR BLD AUTO: 0.7 % (ref 0–0.5)
INR PPP: 1.2 (ref 0.9–1.1)
LEFT ATRIUM VOLUME INDEX: 14.8 ML/M2
LYMPHOCYTES # BLD AUTO: 2.63 10*3/MM3 (ref 0.7–3.1)
LYMPHOCYTES # BLD AUTO: 3.87 10*3/MM3 (ref 0.7–3.1)
LYMPHOCYTES NFR BLD AUTO: 24.8 % (ref 19.6–45.3)
LYMPHOCYTES NFR BLD AUTO: 35.1 % (ref 19.6–45.3)
MAXIMAL PREDICTED HEART RATE: 147 BPM
MCH RBC QN AUTO: 31.3 PG (ref 26.6–33)
MCH RBC QN AUTO: 31.4 PG (ref 26.6–33)
MCHC RBC AUTO-ENTMCNC: 32.6 G/DL (ref 31.5–35.7)
MCHC RBC AUTO-ENTMCNC: 34 G/DL (ref 31.5–35.7)
MCV RBC AUTO: 92.1 FL (ref 79–97)
MCV RBC AUTO: 96.3 FL (ref 79–97)
MONOCYTES # BLD AUTO: 0.75 10*3/MM3 (ref 0.1–0.9)
MONOCYTES # BLD AUTO: 0.78 10*3/MM3 (ref 0.1–0.9)
MONOCYTES NFR BLD AUTO: 7.1 % (ref 5–12)
MONOCYTES NFR BLD AUTO: 7.1 % (ref 5–12)
NEUTROPHILS NFR BLD AUTO: 55 % (ref 42.7–76)
NEUTROPHILS NFR BLD AUTO: 6.08 10*3/MM3 (ref 1.7–7)
NEUTROPHILS NFR BLD AUTO: 66.5 % (ref 42.7–76)
NEUTROPHILS NFR BLD AUTO: 7.08 10*3/MM3 (ref 1.7–7)
NRBC BLD AUTO-RTO: 0 /100 WBC (ref 0–0.2)
NRBC BLD AUTO-RTO: 0.1 /100 WBC (ref 0–0.2)
NT-PROBNP SERPL-MCNC: 9955 PG/ML (ref 0–900)
PLATELET # BLD AUTO: 311 10*3/MM3 (ref 140–450)
PLATELET # BLD AUTO: 325 10*3/MM3 (ref 140–450)
PMV BLD AUTO: 10.1 FL (ref 6–12)
PMV BLD AUTO: 10.3 FL (ref 6–12)
POTASSIUM SERPL-SCNC: 4.3 MMOL/L (ref 3.5–5.2)
PROT SERPL-MCNC: 6.9 G/DL (ref 6–8.5)
PROTHROMBIN TIME: 15.3 SECONDS (ref 11.7–14.2)
QT INTERVAL: 409 MS
RBC # BLD AUTO: 3.16 10*6/MM3 (ref 3.77–5.28)
RBC # BLD AUTO: 3.25 10*6/MM3 (ref 3.77–5.28)
SAO2 % BLDA: 54 % (ref 95–98)
SODIUM SERPL-SCNC: 131 MMOL/L (ref 136–145)
STRESS TARGET HR: 125 BPM
TROPONIN T SERPL-MCNC: 0.07 NG/ML (ref 0–0.03)
WBC NRBC COR # BLD: 10.62 10*3/MM3 (ref 3.4–10.8)
WBC NRBC COR # BLD: 11.04 10*3/MM3 (ref 3.4–10.8)

## 2022-11-07 PROCEDURE — 99153 MOD SED SAME PHYS/QHP EA: CPT | Performed by: INTERNAL MEDICINE

## 2022-11-07 PROCEDURE — 99152 MOD SED SAME PHYS/QHP 5/>YRS: CPT | Performed by: INTERNAL MEDICINE

## 2022-11-07 PROCEDURE — 99233 SBSQ HOSP IP/OBS HIGH 50: CPT | Performed by: INTERNAL MEDICINE

## 2022-11-07 PROCEDURE — 82810 BLOOD GASES O2 SAT ONLY: CPT

## 2022-11-07 PROCEDURE — 85730 THROMBOPLASTIN TIME PARTIAL: CPT | Performed by: INTERNAL MEDICINE

## 2022-11-07 PROCEDURE — 25010000002 FENTANYL CITRATE (PF) 50 MCG/ML SOLUTION: Performed by: INTERNAL MEDICINE

## 2022-11-07 PROCEDURE — 37211 THROMBOLYTIC ART THERAPY: CPT | Performed by: INTERNAL MEDICINE

## 2022-11-07 PROCEDURE — 82962 GLUCOSE BLOOD TEST: CPT

## 2022-11-07 PROCEDURE — 81241 F5 GENE: CPT | Performed by: INTERNAL MEDICINE

## 2022-11-07 PROCEDURE — 25010000002 PHENYLEPHRINE 10 MG/ML SOLUTION: Performed by: INTERNAL MEDICINE

## 2022-11-07 PROCEDURE — 85018 HEMOGLOBIN: CPT

## 2022-11-07 PROCEDURE — 4A023N6 MEASUREMENT OF CARDIAC SAMPLING AND PRESSURE, RIGHT HEART, PERCUTANEOUS APPROACH: ICD-10-PCS | Performed by: INTERNAL MEDICINE

## 2022-11-07 PROCEDURE — 63710000001 INSULIN REGULAR HUMAN PER 5 UNITS: Performed by: NURSE PRACTITIONER

## 2022-11-07 PROCEDURE — 99024 POSTOP FOLLOW-UP VISIT: CPT

## 2022-11-07 PROCEDURE — 02FQ3Z0 FRAGMENTATION OF RIGHT PULMONARY ARTERY, PERCUTANEOUS APPROACH, ULTRASONIC: ICD-10-PCS | Performed by: INTERNAL MEDICINE

## 2022-11-07 PROCEDURE — 84484 ASSAY OF TROPONIN QUANT: CPT | Performed by: INTERNAL MEDICINE

## 2022-11-07 PROCEDURE — 93306 TTE W/DOPPLER COMPLETE: CPT

## 2022-11-07 PROCEDURE — C1894 INTRO/SHEATH, NON-LASER: HCPCS | Performed by: INTERNAL MEDICINE

## 2022-11-07 PROCEDURE — 80053 COMPREHEN METABOLIC PANEL: CPT | Performed by: INTERNAL MEDICINE

## 2022-11-07 PROCEDURE — 25010000002 MIDAZOLAM PER 1 MG: Performed by: INTERNAL MEDICINE

## 2022-11-07 PROCEDURE — 25010000002 ALTEPLASE 2 MG RECONSTITUTED SOLUTION 1 EACH VIAL: Performed by: INTERNAL MEDICINE

## 2022-11-07 PROCEDURE — 02FR3Z0 FRAGMENTATION OF LEFT PULMONARY ARTERY, PERCUTANEOUS APPROACH, ULTRASONIC: ICD-10-PCS | Performed by: INTERNAL MEDICINE

## 2022-11-07 PROCEDURE — C1751 CATH, INF, PER/CENT/MIDLINE: HCPCS | Performed by: INTERNAL MEDICINE

## 2022-11-07 PROCEDURE — 85730 THROMBOPLASTIN TIME PARTIAL: CPT | Performed by: NURSE PRACTITIONER

## 2022-11-07 PROCEDURE — 3E06317 INTRODUCTION OF OTHER THROMBOLYTIC INTO CENTRAL ARTERY, PERCUTANEOUS APPROACH: ICD-10-PCS | Performed by: INTERNAL MEDICINE

## 2022-11-07 PROCEDURE — 83605 ASSAY OF LACTIC ACID: CPT | Performed by: STUDENT IN AN ORGANIZED HEALTH CARE EDUCATION/TRAINING PROGRAM

## 2022-11-07 PROCEDURE — 99221 1ST HOSP IP/OBS SF/LOW 40: CPT | Performed by: INTERNAL MEDICINE

## 2022-11-07 PROCEDURE — 85025 COMPLETE CBC W/AUTO DIFF WBC: CPT | Performed by: NURSE PRACTITIONER

## 2022-11-07 PROCEDURE — C1769 GUIDE WIRE: HCPCS | Performed by: INTERNAL MEDICINE

## 2022-11-07 PROCEDURE — 93306 TTE W/DOPPLER COMPLETE: CPT | Performed by: INTERNAL MEDICINE

## 2022-11-07 PROCEDURE — 85347 COAGULATION TIME ACTIVATED: CPT

## 2022-11-07 PROCEDURE — 85014 HEMATOCRIT: CPT

## 2022-11-07 PROCEDURE — 83880 ASSAY OF NATRIURETIC PEPTIDE: CPT | Performed by: INTERNAL MEDICINE

## 2022-11-07 RX ORDER — MIDAZOLAM HYDROCHLORIDE 1 MG/ML
INJECTION INTRAMUSCULAR; INTRAVENOUS
Status: DISCONTINUED | OUTPATIENT
Start: 2022-11-07 | End: 2022-11-07 | Stop reason: HOSPADM

## 2022-11-07 RX ORDER — ACETAMINOPHEN 325 MG/1
650 TABLET ORAL EVERY 4 HOURS PRN
Status: DISCONTINUED | OUTPATIENT
Start: 2022-11-07 | End: 2022-11-11 | Stop reason: HOSPADM

## 2022-11-07 RX ORDER — SODIUM CHLORIDE 9 MG/ML
35 INJECTION, SOLUTION INTRAVENOUS CONTINUOUS
Status: DISCONTINUED | OUTPATIENT
Start: 2022-11-07 | End: 2022-11-08

## 2022-11-07 RX ORDER — FENTANYL CITRATE 50 UG/ML
INJECTION, SOLUTION INTRAMUSCULAR; INTRAVENOUS
Status: DISCONTINUED | OUTPATIENT
Start: 2022-11-07 | End: 2022-11-07 | Stop reason: HOSPADM

## 2022-11-07 RX ORDER — SODIUM CHLORIDE 9 MG/ML
INJECTION, SOLUTION INTRAVENOUS
Status: COMPLETED | OUTPATIENT
Start: 2022-11-07 | End: 2022-11-07

## 2022-11-07 RX ORDER — HYDROCODONE BITARTRATE AND ACETAMINOPHEN 5; 325 MG/1; MG/1
1 TABLET ORAL EVERY 4 HOURS PRN
Status: DISCONTINUED | OUTPATIENT
Start: 2022-11-07 | End: 2022-11-11 | Stop reason: HOSPADM

## 2022-11-07 RX ORDER — LIDOCAINE HYDROCHLORIDE 20 MG/ML
INJECTION, SOLUTION INFILTRATION; PERINEURAL
Status: DISCONTINUED | OUTPATIENT
Start: 2022-11-07 | End: 2022-11-07 | Stop reason: HOSPADM

## 2022-11-07 RX ORDER — PHENYLEPHRINE HCL IN 0.9% NACL 0.5 MG/5ML
.5-3 SYRINGE (ML) INTRAVENOUS
Status: DISCONTINUED | OUTPATIENT
Start: 2022-11-07 | End: 2022-11-08

## 2022-11-07 RX ORDER — ONDANSETRON 2 MG/ML
4 INJECTION INTRAMUSCULAR; INTRAVENOUS EVERY 6 HOURS PRN
Status: DISCONTINUED | OUTPATIENT
Start: 2022-11-07 | End: 2022-11-11 | Stop reason: HOSPADM

## 2022-11-07 RX ORDER — ONDANSETRON 4 MG/1
4 TABLET, FILM COATED ORAL EVERY 6 HOURS PRN
Status: DISCONTINUED | OUTPATIENT
Start: 2022-11-07 | End: 2022-11-11 | Stop reason: HOSPADM

## 2022-11-07 RX ORDER — HEPARIN SODIUM 10000 [USP'U]/100ML
400 INJECTION, SOLUTION INTRAVENOUS CONTINUOUS
Status: DISCONTINUED | OUTPATIENT
Start: 2022-11-07 | End: 2022-11-08

## 2022-11-07 RX ADMIN — INSULIN HUMAN 3 UNITS: 100 INJECTION, SOLUTION PARENTERAL at 02:30

## 2022-11-07 RX ADMIN — PREGABALIN 50 MG: 50 CAPSULE ORAL at 20:05

## 2022-11-07 RX ADMIN — PANTOPRAZOLE SODIUM 40 MG: 40 TABLET, DELAYED RELEASE ORAL at 06:33

## 2022-11-07 RX ADMIN — LEVOTHYROXINE SODIUM 75 MCG: 0.07 TABLET ORAL at 08:01

## 2022-11-07 RX ADMIN — ALTEPLASE 1 MG/HR: 2.2 INJECTION, POWDER, LYOPHILIZED, FOR SOLUTION INTRAVENOUS at 12:54

## 2022-11-07 RX ADMIN — TRAMADOL HYDROCHLORIDE 50 MG: 50 TABLET, COATED ORAL at 00:13

## 2022-11-07 RX ADMIN — ALTEPLASE 1 MG/HR: 2.2 INJECTION, POWDER, LYOPHILIZED, FOR SOLUTION INTRAVENOUS at 12:46

## 2022-11-07 RX ADMIN — PHENYLEPHRINE HYDROCHLORIDE 0.5 MCG/KG/MIN: 50 INJECTION INTRAVENOUS at 02:53

## 2022-11-07 RX ADMIN — Medication 10 ML: at 08:02

## 2022-11-07 NOTE — PROGRESS NOTES
"Alejandra Varner  1950 72 y.o.  [id number]      Patient Care Team:  Elysia Reich MD as PCP - General (Family Medicine)  Brian Weems MD as Consulting Physician (Cardiology)    CC: Cervical spine surgery 12 days ago, shortness of breath began 4 days ago but severe shortness of breath 2 days ago, submassive pulmonary embolus    Interval History: Stable but on phenylephrine      Objective   Vital Signs  Temp:  [98.5 °F (36.9 °C)] 98.5 °F (36.9 °C)  Heart Rate:  [68-86] 74  Resp:  [16-17] 16  BP: ()/(42-87) 121/87    Intake/Output Summary (Last 24 hours) at 11/7/2022 1028  Last data filed at 11/7/2022 0432  Gross per 24 hour   Intake 832 ml   Output --   Net 832 ml     Flowsheet Rows    Flowsheet Row First Filed Value   Admission Height 167.6 cm (66\") Documented at 11/06/2022 1928   Admission Weight 93.3 kg (205 lb 11 oz) Documented at 11/06/2022 1928          Physical Exam:   General Appearance:    Alert,oriented, in no acute distress   Lungs:     Clear to auscultation,BS are equal    Heart:    Normal S1 and S2, RRR without murmur, gallop or rub   HEENT:    Sclerae are clear, no JVD or adenopathy   Abdomen:     Normal bowel sounds, soft nontender, nondistended, no HSM   Extremities:   Moves all extremities well, no edema, no cyanosis, no             Redness, no rash     Medication Review:      insulin lispro, 0-7 Units, Subcutaneous, TID AC  insulin regular, 0-14 Units, Subcutaneous, Q6H  levothyroxine, 75 mcg, Oral, Daily  pantoprazole, 40 mg, Oral, QAM  pregabalin, 50 mg, Oral, Nightly  sodium chloride, 10 mL, Intravenous, Q12H      heparin, 12 Units/kg/hr, Last Rate: 19 Units/kg/hr (11/07/22 0828)  phenylephrine, 0.5-3 mcg/kg/min, Last Rate: 0.5 mcg/kg/min (11/07/22 0609)          I reviewed the patient's new clinical results.  I personally viewed and interpreted the patient's EKG/Telemetry data    Assessment/Plan  Active Hospital Problems    Diagnosis  POA   • **Bilateral pulmonary embolism " (HCC) [I26.99]  Yes   • Chest pain, atypical [R07.89]  Unknown   • Stage 3a chronic kidney disease (HCC) [N18.31]  Yes   • Essential hypertension [I10]  Yes   • Mixed hyperlipidemia [E78.2]  Yes   • Hypothyroidism (acquired) [E03.9]  Yes      Resolved Hospital Problems   No resolved problems to display.       She has a submassive pulmonary emboli that interestingly both PEs are not in the main pulmonary artery they are more segmental.  She has a markedly enlarged RV and elevated troponin and BNP consistent with a high risk pulmonary embolus.  She is not eligible for the EKOS trial.  I discussed with neurosurgery and we will take her to the Cath Lab I Esme give her bilateral EKOS therapy which is 1/5 the dose of systemic lytic therapy will still have to watch her for bleeding but I think this is our best option    Brian Weems MD  11/07/22  10:28 EST

## 2022-11-07 NOTE — PROGRESS NOTES
Discharge Planning Assessment  [facility]     Patient Name: Alejandra Varner  MRN: [id number]  Today's Date: 11/7/2022    Admit Date: 11/6/2022    Plan: Home  Pt denies needs   Discharge Needs Assessment     Row Name 11/07/22 1518       Living Environment    People in Home spouse    Current Living Arrangements home    Potentially Unsafe Housing Conditions unable to assess    Primary Care Provided by self    Provides Primary Care For no one    Family Caregiver if Needed spouse;child(adam), adult    Quality of Family Relationships supportive    Living Arrangement Comments Tri level house       Resource/Environmental Concerns    Resource/Environmental Concerns none    Transportation Concerns none       Transition Planning    Patient/Family Anticipates Transition to home with family    Transportation Anticipated family or friend will provide       Discharge Needs Assessment    Current Outpatient/Agency/Support Group skilled nursing facility    Equipment Currently Used at Home cpap    Concerns to be Addressed discharge planning    Equipment Needed After Discharge none    Provided Post Acute Provider List? N/A    Provided Post Acute Provider Quality & Resource List? N/A               Discharge Plan     Row Name 11/07/22 1522       Plan    Plan Home  Pt denies needs    Plan Comments IMM noted.  CCP spoke to patient at bedside.  CCP role explained.  Face sheet verified.  Discharge planning discussed.  Pt emergency contact is her  [place], [phone].   [facility].  Pt PCP is Dr. Elysia Reich.  Pt lives in a three level house with her .  She is independent with ADL’s.  She uses no DME to ambulate.  She has no HH history.  She has no history of rehab.  Plan is Home with no needs.  CCP following              Continued Care and Services - Admitted Since 11/6/2022    Coordination has not been started for this encounter.          Demographic Summary     No documentation.                Functional Status    No documentation.                Psychosocial    No documentation.                Abuse/Neglect    No documentation.                Legal    No documentation.                Substance Abuse    No documentation.                Patient Forms    No documentation.                   Chiquita Lima RN

## 2022-11-07 NOTE — PLAN OF CARE
Pt remains in CCU on [facility]. Pt tolerating well. Amezcua cath placed due to retention and protocol. VSS. Jaden off.   Problem: Adult Inpatient Plan of Care  Goal: Plan of Care Review  Outcome: Ongoing, Progressing  Goal: Patient-Specific Goal (Individualized)  Outcome: Ongoing, Progressing  Goal: Absence of Hospital-Acquired Illness or Injury  Outcome: Ongoing, Progressing  Intervention: Identify and Manage Fall Risk  Recent Flowsheet Documentation  Taken 11/7/2022 1700 by Wendy Wilde RN  Safety Promotion/Fall Prevention: safety round/check completed  Taken 11/7/2022 1600 by Wendy Wilde RN  Safety Promotion/Fall Prevention: safety round/check completed  Taken 11/7/2022 1510 by Wendy Wilde RN  Safety Promotion/Fall Prevention: safety round/check completed  Taken 11/7/2022 1340 by Wendy Wilde RN  Safety Promotion/Fall Prevention: safety round/check completed  Taken 11/7/2022 1200 by Wendy Wilde RN  Safety Promotion/Fall Prevention: safety round/check completed  Taken 11/7/2022 1100 by Wendy Wilde RN  Safety Promotion/Fall Prevention: safety round/check completed  Taken 11/7/2022 1000 by Wendy Wilde RN  Safety Promotion/Fall Prevention: safety round/check completed  Taken 11/7/2022 0800 by Wendy Wilde RN  Safety Promotion/Fall Prevention: safety round/check completed  Taken 11/7/2022 0729 by Wendy Wilde RN  Safety Promotion/Fall Prevention: safety round/check completed  Intervention: Prevent Skin Injury  Recent Flowsheet Documentation  Taken 11/7/2022 1600 by Wendy Wilde RN  Body Position: position changed independently  Skin Protection:   adhesive use limited   skin-to-device areas padded   skin-to-skin areas padded   transparent dressing maintained   tubing/devices free from skin contact  Taken 11/7/2022 1510 by Wendy Wilde RN  Body Position: position changed independently  Taken 11/7/2022 1340 by Wendy Wilde RN  Body Position: position changed independently  Taken 11/7/2022 1200 by  Wendy Wilde RN  Body Position: position changed independently  Taken 11/7/2022 1000 by Wendy Wilde RN  Body Position: position changed independently  Taken 11/7/2022 0800 by Wendy Wilde RN  Body Position: position changed independently  Taken 11/7/2022 0729 by Wendy Wilde RN  Body Position: position changed independently  Skin Protection:   adhesive use limited   skin-to-device areas padded   skin-to-skin areas padded   transparent dressing maintained   tubing/devices free from skin contact  Intervention: Prevent and Manage VTE (Venous Thromboembolism) Risk  Recent Flowsheet Documentation  Taken 11/7/2022 1700 by Wendy Wilde RN  Activity Management:   bedrest   activity adjusted per tolerance  Taken 11/7/2022 1600 by Wendy Wilde RN  Activity Management:   bedrest   activity adjusted per tolerance  Taken 11/7/2022 1510 by Wendy Wilde RN  Activity Management:   bedrest   activity adjusted per tolerance  Taken 11/7/2022 1340 by Wendy Wilde RN  Activity Management:   bedrest   activity adjusted per tolerance  Taken 11/7/2022 1200 by Wendy Wilde RN  Activity Management:   bedrest   activity adjusted per tolerance  Taken 11/7/2022 1100 by Wendy Wilde RN  Activity Management:   bedrest   activity adjusted per tolerance  Taken 11/7/2022 1000 by Wendy Wilde RN  Activity Management:   bedrest   activity adjusted per tolerance  Taken 11/7/2022 0800 by Wendy Wilde RN  Activity Management: activity adjusted per tolerance  Taken 11/7/2022 0729 by Wendy Wilde RN  Activity Management: activity adjusted per tolerance  VTE Prevention/Management: (heparin gtt) other (see comments)  Range of Motion: active ROM (range of motion) encouraged  Intervention: Prevent Infection  Recent Flowsheet Documentation  Taken 11/7/2022 1700 by Wendy Wilde RN  Infection Prevention:   environmental surveillance performed   equipment surfaces disinfected   rest/sleep promoted   personal protective equipment  utilized   hand hygiene promoted   single patient room provided   visitors restricted/screened  Taken 11/7/2022 1600 by Wendy Wilde RN  Infection Prevention:   environmental surveillance performed   equipment surfaces disinfected   hand hygiene promoted   personal protective equipment utilized   rest/sleep promoted   single patient room provided   visitors restricted/screened  Taken 11/7/2022 1510 by Wendy Wilde RN  Infection Prevention:   environmental surveillance performed   equipment surfaces disinfected   hand hygiene promoted   personal protective equipment utilized   rest/sleep promoted   single patient room provided   visitors restricted/screened  Taken 11/7/2022 1340 by Wendy Wilde RN  Infection Prevention:   environmental surveillance performed   equipment surfaces disinfected   hand hygiene promoted   personal protective equipment utilized   rest/sleep promoted   single patient room provided   visitors restricted/screened  Taken 11/7/2022 1200 by Wendy Wilde RN  Infection Prevention:   environmental surveillance performed   equipment surfaces disinfected   hand hygiene promoted   personal protective equipment utilized   rest/sleep promoted   single patient room provided   visitors restricted/screened  Taken 11/7/2022 1100 by Wendy Wilde RN  Infection Prevention:   environmental surveillance performed   equipment surfaces disinfected   hand hygiene promoted   personal protective equipment utilized   rest/sleep promoted   single patient room provided   visitors restricted/screened  Taken 11/7/2022 1000 by Wendy Wilde RN  Infection Prevention:   environmental surveillance performed   equipment surfaces disinfected   hand hygiene promoted   personal protective equipment utilized   rest/sleep promoted   single patient room provided   visitors restricted/screened  Taken 11/7/2022 0800 by Wendy Wilde RN  Infection Prevention:   environmental surveillance performed   equipment surfaces  disinfected   personal protective equipment utilized   hand hygiene promoted   rest/sleep promoted   single patient room provided   visitors restricted/screened  Taken 11/7/2022 0729 by Wendy Wilde RN  Infection Prevention:   environmental surveillance performed   equipment surfaces disinfected   hand hygiene promoted   personal protective equipment utilized   single patient room provided   visitors restricted/screened   rest/sleep promoted  Goal: Optimal Comfort and Wellbeing  Outcome: Ongoing, Progressing  Intervention: Provide Person-Centered Care  Recent Flowsheet Documentation  Taken 11/7/2022 0729 by Wendy Wilde RN  Trust Relationship/Rapport:   care explained   questions answered   questions encouraged  Goal: Readiness for Transition of Care  Outcome: Ongoing, Progressing     Problem: Fall Injury Risk  Goal: Absence of Fall and Fall-Related Injury  Outcome: Ongoing, Progressing  Intervention: Identify and Manage Contributors  Recent Flowsheet Documentation  Taken 11/7/2022 1700 by Wendy Wilde RN  Medication Review/Management: medications reviewed  Taken 11/7/2022 1600 by Wendy Wilde RN  Medication Review/Management: medications reviewed  Taken 11/7/2022 1510 by Wendy Wilde RN  Medication Review/Management: medications reviewed  Taken 11/7/2022 1340 by Wendy Wilde RN  Medication Review/Management: medications reviewed  Taken 11/7/2022 1200 by Wendy Wilde RN  Medication Review/Management: medications reviewed  Taken 11/7/2022 1100 by Wendy Wilde RN  Medication Review/Management: medications reviewed  Taken 11/7/2022 1000 by Wendy Wilde RN  Medication Review/Management: medications reviewed  Taken 11/7/2022 0800 by Wendy Wilde RN  Medication Review/Management: medications reviewed  Taken 11/7/2022 0729 by Wendy Wilde RN  Medication Review/Management: medications reviewed  Intervention: Promote Injury-Free Environment  Recent Flowsheet Documentation  Taken 11/7/2022 1700 by  Wilde, Wendy, RN  Safety Promotion/Fall Prevention: safety round/check completed  Taken 11/7/2022 1600 by Wendy Wilde RN  Safety Promotion/Fall Prevention: safety round/check completed  Taken 11/7/2022 1510 by Wendy Wilde RN  Safety Promotion/Fall Prevention: safety round/check completed  Taken 11/7/2022 1340 by Wendy Wilde RN  Safety Promotion/Fall Prevention: safety round/check completed  Taken 11/7/2022 1200 by Wendy Wilde RN  Safety Promotion/Fall Prevention: safety round/check completed  Taken 11/7/2022 1100 by Wendy Wilde RN  Safety Promotion/Fall Prevention: safety round/check completed  Taken 11/7/2022 1000 by Wendy Wilde RN  Safety Promotion/Fall Prevention: safety round/check completed  Taken 11/7/2022 0800 by Wendy Wilde RN  Safety Promotion/Fall Prevention: safety round/check completed  Taken 11/7/2022 0729 by Wendy Wilde RN  Safety Promotion/Fall Prevention: safety round/check completed     Problem: Skin Injury Risk Increased  Goal: Skin Health and Integrity  Outcome: Ongoing, Progressing  Intervention: Optimize Skin Protection  Recent Flowsheet Documentation  Taken 11/7/2022 1600 by Wendy Wilde RN  Pressure Reduction Techniques:   heels elevated off bed   positioned off wounds  Head of Bed (HOB) Positioning: HOB at 30 degrees  Pressure Reduction Devices: specialty bed utilized  Skin Protection:   adhesive use limited   skin-to-device areas padded   skin-to-skin areas padded   transparent dressing maintained   tubing/devices free from skin contact  Taken 11/7/2022 1510 by Wendy Wilde RN  Head of Bed (HOB) Positioning: HOB elevated  Taken 11/7/2022 1340 by Wendy Wilde RN  Head of Bed (HOB) Positioning: HOB at 30 degrees  Taken 11/7/2022 1200 by Wendy Wilde RN  Head of Bed (HOB) Positioning: HOB at 30 degrees  Taken 11/7/2022 1000 by Wendy Wilde RN  Head of Bed (HOB) Positioning: HOB at 30 degrees  Taken 11/7/2022 0800 by Wendy Wilde RN  Head of Bed ([facility])  Positioning: HOB at 30-45 degrees  Taken 11/7/2022 0729 by Wendy Wilde RN  Pressure Reduction Techniques:   pressure points protected   frequent weight shift encouraged  Head of Bed (HOB) Positioning: HOB at 30-45 degrees  Pressure Reduction Devices:   specialty bed utilized   pressure-redistributing mattress utilized   positioning supports utilized  Skin Protection:   adhesive use limited   skin-to-device areas padded   skin-to-skin areas padded   transparent dressing maintained   tubing/devices free from skin contact     Problem: Diabetes Comorbidity  Goal: Blood Glucose Level Within Targeted Range  Outcome: Ongoing, Progressing  Intervention: Monitor and Manage Glycemia  Recent Flowsheet Documentation  Taken 11/7/2022 1600 by Wendy Wilde RN  Glycemic Management: blood glucose monitored  Taken 11/7/2022 1200 by Wendy Wilde RN  Glycemic Management: blood glucose monitored  Taken 11/7/2022 0729 by Wendy Wilde RN  Glycemic Management: blood glucose monitored     Problem: Hypertension Comorbidity  Goal: Blood Pressure in Desired Range  Outcome: Ongoing, Progressing  Intervention: Maintain Blood Pressure Management  Recent Flowsheet Documentation  Taken 11/7/2022 1700 by Wendy Wilde RN  Medication Review/Management: medications reviewed  Taken 11/7/2022 1600 by Wendy Wilde RN  Medication Review/Management: medications reviewed  Taken 11/7/2022 1510 by Wendy Wilde RN  Medication Review/Management: medications reviewed  Taken 11/7/2022 1340 by Wendy Wilde RN  Medication Review/Management: medications reviewed  Taken 11/7/2022 1200 by Wendy Wilde RN  Medication Review/Management: medications reviewed  Taken 11/7/2022 1100 by Wendy Wilde RN  Medication Review/Management: medications reviewed  Taken 11/7/2022 1000 by Wendy Wilde RN  Medication Review/Management: medications reviewed  Taken 11/7/2022 0800 by Wendy Wilde RN  Medication Review/Management: medications reviewed  Taken  11/7/2022 0729 by Wendy Wilde RN  Medication Review/Management: medications reviewed     Problem: Obstructive Sleep Apnea Risk or Actual Comorbidity Management  Goal: Unobstructed Breathing During Sleep  Outcome: Ongoing, Progressing     Problem: Pain Chronic (Persistent) (Comorbidity Management)  Goal: Acceptable Pain Control and Functional Ability  Outcome: Ongoing, Progressing  Intervention: Manage Persistent Pain  Recent Flowsheet Documentation  Taken 11/7/2022 1700 by Wendy Wilde RN  Medication Review/Management: medications reviewed  Taken 11/7/2022 1600 by Wendy Wilde RN  Medication Review/Management: medications reviewed  Taken 11/7/2022 1510 by Wendy Wilde RN  Medication Review/Management: medications reviewed  Taken 11/7/2022 1340 by Wendy Wilde RN  Medication Review/Management: medications reviewed  Taken 11/7/2022 1200 by Wendy Wilde RN  Medication Review/Management: medications reviewed  Taken 11/7/2022 1100 by Wendy Wilde RN  Medication Review/Management: medications reviewed  Taken 11/7/2022 1000 by Wendy Wilde RN  Medication Review/Management: medications reviewed  Taken 11/7/2022 0800 by Wendy Wilde RN  Medication Review/Management: medications reviewed  Taken 11/7/2022 0729 by Wendy Wilde RN  Medication Review/Management: medications reviewed     Problem: VTE (Venous Thromboembolism)  Goal: VTE (Venous Thromboembolism) Symptom Resolution  Outcome: Ongoing, Progressing  Intervention: Prevent or Manage VTE (Venous Thromboembolism)  Recent Flowsheet Documentation  Taken 11/7/2022 0729 by Wendy Wilde RN  VTE Prevention/Management: (heparin gtt) other (see comments)   Goal Outcome Evaluation:

## 2022-11-07 NOTE — H&P
Patient Name:  Alejandra Varner  YOB: 1950  MRN:  [id number]  Admit Date:  11/6/2022  Patient Care Team:  Elysia Reich MD as PCP - General (Family Medicine)  Brian Weems MD as Consulting Physician (Cardiology)      Subjective   History Present Illness     Chief Complaint: Shortness of Breath     History of Present Illness   Ms. Varner is a 72 year old female, non smoker, with a past medical history of CKD,, HTN, HLD, hypothyroidism, cervical spinal stenosis, 11 days post op cervical discectomy C5-C6 and a cervical fusion C6-C7 with Dr German, who presents to [facility] as a transfer from [facility], after she was found to have extensive  bilateral pulmonary embolism with sings of possible right heart strain on CTA, she was started on heparin drip, Patient was also noted to have elevated troponin levels as cali as 0.5. Patient as subsequently transferred for further evaluation and treatment. Patient states that the last several days, she has had increasing shortness of breath, to the point where she is unable to ambulate from more than a few steps with out taking a break. Patient also reports new onset of left chest pain of 8/10 sharp pain that was made worse with any activity and inhalation, she denies any current chest pain and reports the pain was relieved by morphine administered at [facility].  Patient denies any other acute distress, palpitations, fever, chills, cough, diaphoresis, abdominal pain, nausea, vomiting, diarrhea, constipation, or  symptoms.  Patient also reports no prior personal history of any clotting disorders or previous VTE.she  does endorse family history of factor V Leiden deficiency with her daughter.    Patient will be admitted to this group for further evaluation and treatment of bilateral pulmonary emboli, and other acute and or chronic conditions.  Cardiology consulted for further evaluation who requested patient be transferred  to the CCU, and pulmonology consulted for close observation due to hypotension upon arrival.     Review of Systems   Constitutional: Negative.    HENT: Negative.    Respiratory: Positive for shortness of breath.    Cardiovascular: Negative for chest pain.   Gastrointestinal: Negative.    Genitourinary: Negative.    Musculoskeletal: Negative.    Allergic/Immunologic: Negative.    Neurological: Negative.    Hematological: Negative.    Psychiatric/Behavioral: Negative.    All other systems reviewed and are negative.       Personal History     Past Medical History:   Diagnosis Date   • Cervical spinal stenosis     PAIN DOWN BOTH ARMS--RIGHT WORSE, NUMBNESSS/ TINGLING IN RIGHT HAND/ FINGERS AND TINGLING IN LEFT HAND/FINGERS   • Chronic kidney disease     FOLLOWED BY NEPHROLOGY IN [place], [place] (PT UNAWARE OF NAME)   • Diabetes mellitus (HCC)    • Essential hypertension 11/6/2022   • GERD (gastroesophageal reflux disease)    • Hernia, hiatal    • History of kidney stones    • Hyperlipidemia    • Hypertension    • Hypothyroidism    • Hypothyroidism (acquired) 11/6/2022   • Mixed hyperlipidemia 11/6/2022   • PONV (postoperative nausea and vomiting)    • Stage 3a chronic kidney disease (HCC) 11/6/2022     Past Surgical History:   Procedure Laterality Date   • ANTERIOR CERVICAL DISCECTOMY W/ FUSION N/A 10/26/2022    Procedure: Cervical 5 to cervical 6 and cervical 6 to cervical 7 anterior cervical discectomy, fusion and instrumentation;  Surgeon: Raciel German MD;  Location: [facility];  Service: Neurosurgery;  Laterality: N/A;   • COLONOSCOPY     • CYSTOSCOPY W/ LASER LITHOTRIPSY      MULTIPLE SURGERIES   • REPLACEMENT TOTAL KNEE Left    • TOTAL ABDOMINAL HYSTERECTOMY     • TUBAL COAGULATION LAPAROSCOPIC       Family History   Problem Relation Age of Onset   • Heart disease Mother    • Hypertension Mother    • Diabetes Mother    • Heart disease Father    • Stroke Father    • Hypertension Father    • Diabetes Brother     • Malig Hyperthermia Neg Hx      Social History     Tobacco Use   • Smoking status: Never   Substance Use Topics   • Alcohol use: No   • Drug use: No     No current facility-administered medications on file prior to encounter.     Current Outpatient Medications on File Prior to Encounter   Medication Sig Dispense Refill   • allopurinol (ZYLOPRIM) 100 MG tablet Take 1 tablet by mouth Every Night.     • chlorthalidone (HYGROTON) 25 MG tablet Take 25 mg by mouth Daily.     • Cholecalciferol (Vitamin D) 50 MCG (2000 UT) tablet Take 2,000 Units by mouth Every Night.     • cyclobenzaprine (FLEXERIL) 5 MG tablet Take 5 mg by mouth At Night As Needed.     • glipizide (GLUCOTROL) 5 MG tablet Take 1 tablet by mouth 2 (Two) Times a Day Before Meals.     • lansoprazole (PREVACID) 30 MG capsule Take 30 mg by mouth Daily.     • levothyroxine (SYNTHROID, LEVOTHROID) 75 MCG tablet Take 1 tablet by mouth Daily.     • Magnesium Oxide 400 (240 Mg) MG tablet Take 1 tablet by mouth 2 (Two) Times a Day.     • pregabalin (LYRICA) 50 MG capsule Take 1 capsule by mouth Every Night.     • propranolol (INDERAL) 40 MG tablet Take 1 tablet by mouth 2 (Two) Times a Day.     • simvastatin (ZOCOR) 40 MG tablet Take 40 mg by mouth Every Night.     • Tirzepatide (Mounjaro) 2.5 MG/0.5ML solution pen-injector Inject  under the skin into the appropriate area as directed 1 (One) Time Per Week. WEDNESDAYS     • traMADol (ULTRAM) 50 MG tablet Take 50 mg by mouth 2 (Two) Times a Day As Needed.       No Known Allergies    Objective    Objective     Vital Signs  Temp:  [98.5 °F (36.9 °C)] 98.5 °F (36.9 °C)  Heart Rate:  [78-86] 80  Resp:  [16-17] 16  BP: ()/(49-63) 85/60  SpO2:  [90 %-97 %] 95 %  on  Flow (L/min):  [2-3] 2;   Device (Oxygen Therapy): nasal cannula  Body mass index is 33.7 kg/m².    Physical Exam  Vitals and nursing note reviewed.   Constitutional:       Appearance: Normal appearance. She is obese.      Interventions: Nasal cannula in  place.      Comments: 2L   HENT:      Head: Normocephalic and atraumatic.      Right Ear: External ear normal.      Left Ear: External ear normal.      Mouth/Throat:      Mouth: Mucous membranes are dry.   Eyes:      Pupils: Pupils are equal, round, and reactive to light.   Cardiovascular:      Rate and Rhythm: Normal rate and regular rhythm.      Pulses: Normal pulses.      Heart sounds: Normal heart sounds.   Pulmonary:      Effort: Pulmonary effort is normal.      Breath sounds: Normal breath sounds.   Abdominal:      General: Bowel sounds are normal.      Palpations: Abdomen is soft.   Skin:     General: Skin is dry.      Capillary Refill: Capillary refill takes less than 2 seconds.   Neurological:      General: No focal deficit present.      Mental Status: She is alert and oriented to person, place, and time.   Psychiatric:         Mood and Affect: Mood normal.         Behavior: Behavior normal. Behavior is cooperative.         Thought Content: Thought content normal.         Judgment: Judgment normal.         Results Review:  I reviewed the patient's new clinical results.  I reviewed the patient's new imaging results and agree with the interpretation.  I reviewed the patient's other test results and agree with the interpretation  I personally viewed and interpreted the patient's EKG/Telemetry data  Discussed with ED provider.    Lab Results (last 24 hours)     Procedure Component Value Units Date/Time    Protime-INR [[id number]] Collected: 11/06/22 2249    Specimen: Blood Updated: 11/07/22 0001    aPTT [[id number]] Collected: 11/06/22 2249    Specimen: Blood Updated: 11/07/22 0001    CBC & Differential [[id number]]  (Abnormal) Collected: 11/06/22 2249    Specimen: Blood Updated: 11/07/22 0007    Narrative:      The following orders were created for panel order CBC & Differential.  Procedure                               Abnormality         Status                     ---------                                -----------         ------                     CBC Auto Differential[[id number]]        Abnormal            Final result                 Please view results for these tests on the individual orders.    CBC Auto Differential [[id number]]  (Abnormal) Collected: 11/06/22 2249    Specimen: Blood Updated: 11/07/22 0007     WBC 10.62 10*3/mm3      RBC 3.16 10*6/mm3      Hemoglobin 9.9 g/dL      Hematocrit 29.1 %      MCV 92.1 fL      MCH 31.3 pg      MCHC 34.0 g/dL      RDW 13.4 %      RDW-SD 46.5 fl      MPV 10.3 fL      Platelets 311 10*3/mm3      Neutrophil % 66.5 %      Lymphocyte % 24.8 %      Monocyte % 7.1 %      Eosinophil % 0.6 %      Basophil % 0.3 %      Immature Grans % 0.7 %      Neutrophils, Absolute 7.08 10*3/mm3      Lymphocytes, Absolute 2.63 10*3/mm3      Monocytes, Absolute 0.75 10*3/mm3      Eosinophils, Absolute 0.06 10*3/mm3      Basophils, Absolute 0.03 10*3/mm3      Immature Grans, Absolute 0.07 10*3/mm3      nRBC 0.0 /100 WBC     POC Glucose Once [[id number]]  (Abnormal) Collected: 11/06/22 2332    Specimen: Blood Updated: 11/06/22 2333     Glucose 160 mg/dL      Comment: Meter: [id number] : [id number] Gabehart Emilee NA             Imaging Results (Last 24 Hours)     ** No results found for the last 24 hours. **          Results for orders placed during the hospital encounter of 08/30/18    Adult Transthoracic Echo Complete W/ Cont if Necessary Per Protocol    Interpretation Summary  · Left ventricular systolic function is normal. Calculated EF = 60%.  · Left ventricular diastolic dysfunction is noted (grade I) consistent with impaired relaxation.  · Normal right ventricular cavity size and systolic function noted.  · Left atrial cavity size is mildly dilated.  · A bicuspid aortic valve cannot be excluded. The aortic valve leaflets are mildly calcified.  · Trace-to-mild aortic valve regurgitation is present.  · There is no evidence of pericardial effusion.      ECG 12 Lead Other; admission    Preliminary Result   HEART RATE= 81  bpm   RR Interval= 741  ms   SD Interval= 144  ms   P Horizontal Axis= 11  deg   P Front Axis= 48  deg   QRSD Interval= 85  ms   QT Interval= 409  ms   QRS Axis= 26  deg   T Wave Axis= -2  deg   - ABNORMAL ECG -   Sinus rhythm   Low voltage, precordial leads   Nonspecific T abnormalities, anterior leads   Electronically Signed By:    Date and Time of Study: 2022-11-06 22:05:22           Assessment/Plan     Active Hospital Problems    Diagnosis  POA   • **Bilateral pulmonary embolism (HCC) [I26.99]  Yes   • Stage 3a chronic kidney disease (HCC) [N18.31]  Yes   • Essential hypertension [I10]  Yes   • Mixed hyperlipidemia [E78.2]  Yes   • Hypothyroidism (acquired) [E03.9]  Yes   • Class 2 obesity due to excess calories without serious comorbidity with body mass index (BMI) of 35.0 to 35.9 in adult [E66.09, Z68.35]  Not Applicable   • Palpitations [R00.2]  Yes      Resolved Hospital Problems   No resolved problems to display.     Bilateral pulmonary embolism - with RV strain on CTA   Chest Pain   Dyspnea  CTA positive from bilateral pulmonary embolism with right heart strain-images unavailable-medical records requested  Continue Heparin drip   Cardiology consulted and are following   Echocardiogram ordered  Check duplex dopplers   EKG interpreted as sinus rhythm heart rate 81 with inverted t wave abnormality   Troponin elevated 0.5 upon review of medical records from [facility] VS heart strain urology  Continuous cardiac monitoring pulse oximetry  Supplemental oxygen as needed from hypoxia/respiratory distress  Patient hypotensive upon arrival - likely secondary to morphine -  Vital signs per policy     Type 2 diabetes mellitus  Chronic   Accu-Cheks ACH S  Hold home glipizide, and mounjaro   SSI ordered for hyperglycemia   Accu-Cheks ACH S  Hypoglycemia protocol ordered    Stage 3a chronic kidney disease   Chronic  Creatinine 1.4  Monitor I's and O's  Daily weights  Avoid  hypovolemia hypotension and nephrotoxic medications.    Essential hypertension  Chronic  Patient hypotensive at time of arrival was given 1 L fluid volus   Vital signs per policy   Start home antihypertensives when clinically appropriate to chlorthalidone,    Mixed hyperlipidemia  Chronic/Stable   Continue home statin when clinically appropriate     Hypothyroidism (acquired)  Chronic/Stable   Continue home levothyroxine    · I discussed the patient's findings and my recommendations with patient.    VTE Prophylaxis - SCDs.  Code Status - Full code.       KERWIN Pino  [facility]  11/07/22  00:40 EST

## 2022-11-07 NOTE — PLAN OF CARE
Goal Outcome Evaluation:           Progress: no change     Pt arrive to CCU, alert and oriented with heparin drip for bilateral PE. Gave tramadol x1 for chest pain and tightness. Pt was not able to maintain BP with systolic in 70s and 80s, called Dr. Yoon Stanley drip started. Pt has a paper in the chart with a barcode to scan to see the CTA image from [facility]. VSS.

## 2022-11-07 NOTE — PROGRESS NOTES
"  PROGRESS NOTE  Patient Name: Alejandra Varner  Age/Sex: 72 y.o. female  : 1950  MRN: [id number]    Date of Admission: 2022  Date of Encounter Visit: 22   LOS: 1 day   Patient Care Team:  Elysia Reich MD as PCP - General (Family Medicine)  Brian Weems MD as Consulting Physician (Cardiology)    Chief Complaint: DVT with PE    Hospital course: Patient was admitted with pulmonary embolism after a cervical disc surgery.  Her daughter has factor V Leiden mutation but she was never tested for it herself.  She came in with shortness of breath, was found to have pulmonary embolism, was seen in consultation by cardiology with the plan to go for EKOS catheter placement later today.  She is awake and responsive, she is compensating on nasal cannula oxygen at 2 L/min.  She denies any significant leg discomfort.  No nausea or vomiting or GI complaints       REVIEW OF SYSTEMS:   CONSTITUTIONAL: no fever or chills  CARDIOVASCULAR: No chest pain, chest pressure or chest discomfort. No palpitations or edema.   RESPIRATORY: Shortness of breath, no hemoptysis or pleurisy.   GASTROINTESTINAL: No anorexia, nausea, vomiting or diarrhea. No abdominal pain or blood.   HEMATOLOGIC: No bleeding or bruising.     Ventilator/Non-Invasive Ventilation Settings (From admission, onward)    2 L/min nasal cannula oxygen            Vital Signs  Temp:  [98.5 °F (36.9 °C)] 98.5 °F (36.9 °C)  Heart Rate:  [68-86] 74  Resp:  [16-17] 16  BP: ()/(42-87) 121/87  SpO2:  [90 %-98 %] 93 %  on  Flow (L/min):  [2-3] 2 Device (Oxygen Therapy): nasal cannula    Intake/Output Summary (Last 24 hours) at 2022 1015  Last data filed at 2022 0432  Gross per 24 hour   Intake 832 ml   Output --   Net 832 ml     Flowsheet Rows    Flowsheet Row First Filed Value   Admission Height 167.6 cm (66\") Documented at 2022   Admission Weight 93.3 kg (205 lb 11 oz) Documented at 2022        Body mass index is 32.44 " kg/m².      11/06/22  2216 11/07/22  0506 11/07/22  0906   Weight: 94.7 kg (208 lb 12.4 oz) 94.7 kg (208 lb 12.4 oz) 91.2 kg (201 lb)       Physical Exam:  GEN:  No acute distress, alert, cooperative, well developed   EYES:   Sclerae clear. No icterus. PERRL. Normal EOM  ENT:   External ears/nose normal, no oral lesions, no thrush, mucous membranes moist  NECK: patient has a soft neck collar on could not check for range of motion, midline trachea, no JVD  LUNGS: Normal chest on inspection, CTAB, no wheezes. No rhonchi. No crackles. Respirations regular, even and unlabored.   CV:  Regular rhythm and rate. Normal S1/S2. No murmurs, gallops, or rubs noted.  ABD:  Soft, nontender and nondistended. Normal bowel sounds. No guarding  EXT:  Moves all extremities well. No cyanosis. No redness. nonpitting bilateral edema .   Skin: Dry, intact, no bleeding    Results Review:    Results From Last 14 Days   Lab Units 11/07/22  0609 11/07/22  0024   LACTATE mmol/L 1.4 4.9*           Invalid input(s):  BILIRUBIN  Results from last 7 days   Lab Units 11/07/22  0609   TROPONIN T ng/mL 0.069*         Results from last 7 days   Lab Units 11/07/22  0609   PROBNP pg/mL 9,955.0*     Results from last 7 days   Lab Units 11/07/22  0609 11/06/22  2249   WBC 10*3/mm3 11.04* 10.62   HEMOGLOBIN g/dL 10.2* 9.9*   HEMATOCRIT % 31.3* 29.1*   PLATELETS 10*3/mm3 325 311   MCV fL 96.3 92.1   NEUTROPHIL % % 55.0 66.5   LYMPHOCYTE % % 35.1 24.8   MONOCYTES % % 7.1 7.1   EOSINOPHIL % % 1.7 0.6   BASOPHIL % % 0.5 0.3   IMM GRAN % % 0.6* 0.7*     Results from last 7 days   Lab Units 11/07/22  0609 11/06/22  2249   INR   --  1.20*   APTT seconds >200.0* 87.5*               Invalid input(s): LDLCALC          Glucose   Date/Time Value Ref Range Status   11/07/2022 0552 99 70 - 130 mg/dL Final     Comment:     Meter: [id number] : [id number] Stefania Chandler RN   11/07/2022 0226 133 (H) 70 - 130 mg/dL Final     Comment:     Meter: [id number] :  [id number] Navyascottie Marcelin RN   11/06/2022 2332 160 (H) 70 - 130 mg/dL Final     Comment:     Meter: [id number] : [id number] Gabehart Emilee NA     Results from last 7 days   Lab Units 11/07/22 0609 11/07/22  0024   LACTATE mmol/L 1.4 4.9*                           Imaging:   Imaging Results (All)     Procedure Component Value Units Date/Time    CT Outside Films [[id number]] Resulted: 11/07/22 0832     Updated: 11/07/22 0832    Narrative:      This procedure was auto-finalized with no dictation required.          I reviewed the patient's new clinical results.  I personally viewed and interpreted the patient's imaging results:        Medication Review:   insulin lispro, 0-7 Units, Subcutaneous, TID AC  insulin regular, 0-14 Units, Subcutaneous, Q6H  levothyroxine, 75 mcg, Oral, Daily  pantoprazole, 40 mg, Oral, QAM  pregabalin, 50 mg, Oral, Nightly  sodium chloride, 10 mL, Intravenous, Q12H        heparin, 12 Units/kg/hr, Last Rate: 19 Units/kg/hr (11/07/22 0828)  phenylephrine, 0.5-3 mcg/kg/min, Last Rate: 0.5 mcg/kg/min (11/07/22 0609)        ASSESSMENT:   Acute bilateral PE, probably provoked due to immobilization and recent surgery  Dyspnea secondary to above  Elevated troponin, secondary to cardiac strain from PE VS non-STEMI type II  RV strain on CT chest  Transient hypotension  Abnormal EKG: T wave inversion in lead V2 (old) in V3 ( new)  ENID, on Pap  CKD stage III  Family history of factor V Leiden  Cervical stenosis and radiculopathy, s/p anterior cervical discectomy C5-C6 and C6/C7 cervical fusion 10/26  DM type II  HTN  Hypothyroidism  Osteoarthritis    PLAN:  Proceed with the EKOS with heparin drip and thrombolysis  IV pressors as needed to maintain  mean arterial pressure above 65, expect that to improve after the lysis of the pulmonary embolism because patient had evidence of right ventricular strain on the CT scan  We will continue to monitor in the ICU afterwards especially while on the tPA and heparin  after a recent neck surgery.  Discussed with the patient, they already discussed with cardiology and they are agreeable to proceed.    Discussed with CCU rounding team    Disposition:     Jessica Ibrahim MD  11/07/22  10:15 EST           Dictated utilizing Dragon dictation

## 2022-11-07 NOTE — PROGRESS NOTES
[facility] NEUROSURGERY PROGRESS NOTE      CC: 12 days s/p C5-7 ACDF; submassive PE      Subjective     Interval History: Reports doing well.  Feels as though she is recovering well from her cervical surgery.  Denies any current neck pain.  Denies any pressure in her neck.  Presented to the hospital shortness of breath and was found to have a submassive pulmonary embolism.    Objective     Vital signs in last 24 hours:  Temp:  [98.5 °F (36.9 °C)] 98.5 °F (36.9 °C)  Heart Rate:  [68-86] 74  Resp:  [16-17] 16  BP: ()/(42-87) 121/87    Intake/Output this shift:  No intake/output data recorded.    LABS:  Results from last 7 days   Lab Units 11/07/22  0609 11/06/22  2249   WBC 10*3/mm3 11.04* 10.62   HEMOGLOBIN g/dL 10.2* 9.9*   HEMATOCRIT % 31.3* 29.1*   PLATELETS 10*3/mm3 325 311       IMAGING STUDIES:  No radiology results for the last day      I personally viewed and interpreted the patient's chart.    Meds reviewed/changed: Yes      Physical Exam:    General:   Awake, alert, oriented x3. Speech clear with no aphasia  HEENT:    NC; AT  Neck:    Soft collar On ; trachea midline;      incision edges well approximated without erythema or drainage, Steri-Strips intact.  Area surrounding incision soft and not discolored.  Motor: 5/5 bilateral handgrip, bicep, tricep strength. no fasciculations, rigidity, spasticity, or abnormal movements.  Reflexes:  No Ortiz, no clonus  Sensation: Normal to light touch bilat UE  Extremities:   Negative Nataliia bilat    Assessment & Plan     ASSESSMENT:      Bilateral pulmonary embolism (HCC)    Stage 3a chronic kidney disease (HCC)    Essential hypertension    Mixed hyperlipidemia    Hypothyroidism (acquired)    Chest pain, atypical    72-year-old female who is 12 days s/p two-level ACDF.  She is recovering well from her surgery.  Presented to the hospital shortness of breath and was found to have a submassive PE.  On exam, her surgical site is healing well and she does not have any  "evidence of hematoma.  She is okay for low-dose tPA for treatment of her submassive PE.  Given that her two-level ACDF was 12 days ago, we believe that the benefit of using the tPA to treat her submassive PE and situation outweighs the risk of hematoma formation given that she is 12 days postop.  I discussed with the patient and her nurse signs and symptoms to look out for for hematoma formation including difficulty breathing and pressure in her neck.    PLAN:   OK for low dose tPA    I discussed the patient's findings and my recommendations with patient, nursing staff and Dr. German.       LOS: 1 day       Chelo Garner PA-C  11/7/2022  10:33 EST    \"Dictated utilizing Dragon dictation\".      "

## 2022-11-07 NOTE — CONSULTS
[facility]  Cardiology Consult Note    Patient Identification:  Name: Alejandra Varner  Age: 72 y.o.  Sex: female  :  1950  MRN: [id number]             Requesting Physician: Dr. Adrian  Reason for Consultation / Chief Complaint: management recommendations pulmonary emboli with a low blood pressure    History of Present Illness:   72-year-old female recently underwent neck surgery presented to [facility] with shortness of breath was found to have the bilateral pulmonary emboli    Patient also has history of the hypertension    3 years ago patient was seen by Dr. Weems, cardiac work-up was negative    Comorbid cardiac risk factors:     Past Medical History:  Past Medical History:   Diagnosis Date   • Cervical spinal stenosis     PAIN DOWN BOTH ARMS--RIGHT WORSE, NUMBNESSS/ TINGLING IN RIGHT HAND/ FINGERS AND TINGLING IN LEFT HAND/FINGERS   • Chronic kidney disease     FOLLOWED BY NEPHROLOGY IN [place], [place] (PT UNAWARE OF NAME)   • Diabetes mellitus (HCC)    • Essential hypertension 2022   • GERD (gastroesophageal reflux disease)    • Hernia, hiatal    • History of kidney stones    • Hyperlipidemia    • Hypertension    • Hypothyroidism    • Hypothyroidism (acquired) 2022   • Mixed hyperlipidemia 2022   • PONV (postoperative nausea and vomiting)    • Stage 3a chronic kidney disease (HCC) 2022     Past Surgical History:  Past Surgical History:   Procedure Laterality Date   • ANTERIOR CERVICAL DISCECTOMY W/ FUSION N/A 10/26/2022    Procedure: Cervical 5 to cervical 6 and cervical 6 to cervical 7 anterior cervical discectomy, fusion and instrumentation;  Surgeon: Raciel German MD;  Location: [facility];  Service: Neurosurgery;  Laterality: N/A;   • COLONOSCOPY     • CYSTOSCOPY W/ LASER LITHOTRIPSY      MULTIPLE SURGERIES   • REPLACEMENT TOTAL KNEE Left    • TOTAL ABDOMINAL HYSTERECTOMY     • TUBAL COAGULATION LAPAROSCOPIC        Allergies:  No Known  Allergies  Home Meds:  Medications Prior to Admission   Medication Sig Dispense Refill Last Dose   • allopurinol (ZYLOPRIM) 100 MG tablet Take 1 tablet by mouth Every Night.      • chlorthalidone (HYGROTON) 25 MG tablet Take 25 mg by mouth Daily.      • Cholecalciferol (Vitamin D) 50 MCG (2000 UT) tablet Take 2,000 Units by mouth Every Night.      • cyclobenzaprine (FLEXERIL) 5 MG tablet Take 5 mg by mouth At Night As Needed.      • glipizide (GLUCOTROL) 5 MG tablet Take 1 tablet by mouth 2 (Two) Times a Day Before Meals.      • lansoprazole (PREVACID) 30 MG capsule Take 30 mg by mouth Daily.      • levothyroxine (SYNTHROID, LEVOTHROID) 75 MCG tablet Take 1 tablet by mouth Daily.      • Magnesium Oxide 400 (240 Mg) MG tablet Take 1 tablet by mouth 2 (Two) Times a Day.      • pregabalin (LYRICA) 50 MG capsule Take 1 capsule by mouth Every Night.      • propranolol (INDERAL) 40 MG tablet Take 1 tablet by mouth 2 (Two) Times a Day.      • simvastatin (ZOCOR) 40 MG tablet Take 40 mg by mouth Every Night.      • Tirzepatide (Mounjaro) 2.5 MG/0.5ML solution pen-injector Inject  under the skin into the appropriate area as directed 1 (One) Time Per Week. WEDNESDAYS      • traMADol (ULTRAM) 50 MG tablet Take 50 mg by mouth 2 (Two) Times a Day As Needed.        Current Meds:   [unfilled]  Social History:   Social History     Tobacco Use   • Smoking status: Never   • Smokeless tobacco: Not on file   Substance Use Topics   • Alcohol use: No      Family History:  Family History   Problem Relation Age of Onset   • Heart disease Mother    • Hypertension Mother    • Diabetes Mother    • Heart disease Father    • Stroke Father    • Hypertension Father    • Diabetes Brother    • Malig Hyperthermia Neg Hx         Review of Systems    Constitutional: No weakness,fatigue, fever, rigors, chills   Eyes: No vision changes, eye pain   ENT/oropharynx: No difficulty swallowing, sore throat, epistaxis, changes in hearing   Cardiovascular: No  chest pain, chest tightness, palpitations, paroxysmal nocturnal dyspnea, orthopnea, diaphoresis, dizziness / syncopal episode   Respiratory:  Shortness of breath   Gastrointestinal: No abdominal pain, nausea, vomiting, diarrhea, bloody stools   Genitourinary: No hematuria, dysuria   Neurological: No headache, tremors, numbness,  one-sided weakness    Musculoskeletal: No cramps, myalgias,  joint pain, joint swelling   Integument: No rash, edema           Constitutional:  Temp:  [98.5 °F (36.9 °C)] 98.5 °F (36.9 °C)  Heart Rate:  [74-86] 74  Resp:  [16-17] 16  BP: ()/(49-63) 74/50    Physical Exam   General:  Appears in no acute distress  Eyes: PERTL,  HEENT:  No JVD. Thyroid not visibly enlarged. No mucosal pallor or cyanosis  Respiratory: Respirations regular and unlabored at rest. BBS with good air entry in all fields. No crackles, rubs or wheezes auscultated  Cardiovascular: S1S2 Regular rate and rhythm. No murmur, rub or gallop auscultated. No carotid bruits. DP/PT pulses    . No pretibial pitting edema  Gastrointestinal: Abdomen soft, flat, non tender. Bowel sounds present. No hepatosplenomegaly. No ascites  Musculoskeletal: TELLO x4. No abnormal movements  Extremities: No digital clubbing or cyanosis  Skin: Color pink. Skin warm and dry to touch. No rashes  No xanthoma  Neuro: AAO x3 CN II-XII grossly intact              Cardiographics  ECG:     Telemetry:    Echocardiogram:     Imaging  Chest X-ray:     Lab Review               @[id number]@  Results from last 7 days   Lab Units 11/06/22  2249   WBC 10*3/mm3 10.62   HEMOGLOBIN g/dL 9.9*   HEMATOCRIT % 29.1*   PLATELETS 10*3/mm3 311     Results from last 7 days   Lab Units 11/06/22  2249   INR  1.20*   APTT seconds 87.5*         Assessment:  Acute bilateral pulmonary embolism  Shortness of breath  Anemia  Elevated troponin due to pulmonary embolism  Right-sided strain  Recent neck surgery  History of hypertension    Recommendations / Plan:   Considering  the patient has a bilateral pulmonary emboli along with right-sided strain a stat echocardiogram will be performed  Patient is mending the blood pressure 105 systolic  Patient had a low blood pressure responded well to the volume  Patient probably will need the bilateral thrombectomy  We will keep the patient n.p.o.  Will discuss with Dr. Dank Nettles MD  11/7/2022, 02:49 EST      FREDA Adams/Transcription:   Dictated utilizing Dragon dictation

## 2022-11-07 NOTE — CONSULTS
Referring Provider: Dr. Du  Reason for Consultation: Critical care management    Patient Care Team:  Elysia Reich MD as PCP - General (Family Medicine)  Brian Weems MD as Consulting Physician (Cardiology)    Chief complaint:   Shortness of breath    History of present illness:    Subjective   This is a 72-year-old female patient, lifelong non-smoker with no prior history of lung disease.  She has a history of HTN and CKD stage III.  She had a daughter who has factor V Leiden deficiency but patient was not tested for the disorder.  She had no prior history of VTE.    Patient underwent anterior cervical discectomy C5-C6 and C6/C7 cervical fusion for cervical stenosis and radiculopathy on 10/26/2022.  She said that she has been active since surgery.  About 3 days ago, she started experiencing shortness of breath which got significantly worse yesterday and therefore she was hospitalized at [facility].  She underwent CTA chest which was reported as extensive bilateral pulmonary emboli and signs of right heart strain (images are not available for me to review) and therefore she was treated with heparin drip.  It does not appear that she had an echocardiogram.  She was also noted to have elevated troponin at 0.4-0.5 on multiple occasions and therefore she was transferred over here for higher level of care due to suspected non-STEMI.  Patient stated that today she experienced some chest pain on the left side below her ribs which was worse during deep inspiration and it was mild to moderate in intensity and it has resolved by now.  She was treated with morphine and she received a total of morphine 9 mg today.  She has no associated cough.  Her dyspnea has improved.  She is currently on 2 L oxygen.    Patient was initially admitted to the floor under A.  On arrival, she was noted to be slightly hypotensive with BP of 74/50.  She takes chlorthalidone 25 mg daily and propranolol 40  mg twice a day at home but is unclear whether this was continued for her during her hospitalization.  Nonetheless, she received a liter of IV fluid on the floor and this corrected her blood pressure.  Primary team and cardiology requested transfer to the intensive care unit for monitoring.    I reviewed the records from [facility].  Labs: Troponin 0.5; creatinine 1.4.  No leukocytosis.  Platelets normal.    Review of Systems  Constitutional: No fever or chills.   ENMT: No sinus congestion  Cardiovascular: No chest pain, palpitation or legs swelling.    Respiratory: See above  Gastrointestinal: No constipation, diarrhea or abdominal pain   Neurology: No headache, weakness, numbness or dizziness.   Musculoskeletal: Arthritis.  She takes tramadol at home.  No worsening pain or swelling.  Psychiatry: No depression.  Genitourinary: No dysuria or frequent urination  Endo: No weight changes. No cold or warm intolerance.  Lymphatic: No swollen glands.  Integumentary: No rash.    History  Past Medical History:   Diagnosis Date   • Cervical spinal stenosis     PAIN DOWN BOTH ARMS--RIGHT WORSE, NUMBNESSS/ TINGLING IN RIGHT HAND/ FINGERS AND TINGLING IN LEFT HAND/FINGERS   • Chronic kidney disease     FOLLOWED BY NEPHROLOGY IN [place], [place] (PT UNAWARE OF NAME)   • Diabetes mellitus (HCC)    • Essential hypertension 11/6/2022   • GERD (gastroesophageal reflux disease)    • Hernia, hiatal    • History of kidney stones    • Hyperlipidemia    • Hypertension    • Hypothyroidism    • Hypothyroidism (acquired) 11/6/2022   • Mixed hyperlipidemia 11/6/2022   • PONV (postoperative nausea and vomiting)    • Stage 3a chronic kidney disease (HCC) 11/6/2022   ,   Past Surgical History:   Procedure Laterality Date   • ANTERIOR CERVICAL DISCECTOMY W/ FUSION N/A 10/26/2022    Procedure: Cervical 5 to cervical 6 and cervical 6 to cervical 7 anterior cervical discectomy, fusion and instrumentation;  Surgeon: Raciel German MD;   Location: [facility] MAIN OR;  Service: Neurosurgery;  Laterality: N/A;   • COLONOSCOPY     • CYSTOSCOPY W/ LASER LITHOTRIPSY      MULTIPLE SURGERIES   • REPLACEMENT TOTAL KNEE Left    • TOTAL ABDOMINAL HYSTERECTOMY     • TUBAL COAGULATION LAPAROSCOPIC     ,   Family History   Problem Relation Age of Onset   • Heart disease Mother    • Hypertension Mother    • Diabetes Mother    • Heart disease Father    • Stroke Father    • Hypertension Father    • Diabetes Brother    • Malig Hyperthermia Neg Hx    ,   Social History     Socioeconomic History   • Marital status:    Tobacco Use   • Smoking status: Never   Substance and Sexual Activity   • Alcohol use: No   • Drug use: No   • Sexual activity: Defer     E-cigarette/Vaping     E-cigarette/Vaping Substances     E-cigarette/Vaping Devices       ,   Medications Prior to Admission   Medication Sig Dispense Refill Last Dose   • allopurinol (ZYLOPRIM) 100 MG tablet Take 1 tablet by mouth Every Night.      • chlorthalidone (HYGROTON) 25 MG tablet Take 25 mg by mouth Daily.      • Cholecalciferol (Vitamin D) 50 MCG (2000 UT) tablet Take 2,000 Units by mouth Every Night.      • cyclobenzaprine (FLEXERIL) 5 MG tablet Take 5 mg by mouth At Night As Needed.      • glipizide (GLUCOTROL) 5 MG tablet Take 1 tablet by mouth 2 (Two) Times a Day Before Meals.      • lansoprazole (PREVACID) 30 MG capsule Take 30 mg by mouth Daily.      • levothyroxine (SYNTHROID, LEVOTHROID) 75 MCG tablet Take 1 tablet by mouth Daily.      • Magnesium Oxide 400 (240 Mg) MG tablet Take 1 tablet by mouth 2 (Two) Times a Day.      • pregabalin (LYRICA) 50 MG capsule Take 1 capsule by mouth Every Night.      • propranolol (INDERAL) 40 MG tablet Take 1 tablet by mouth 2 (Two) Times a Day.      • simvastatin (ZOCOR) 40 MG tablet Take 40 mg by mouth Every Night.      • Tirzepatide (Mounjaro) 2.5 MG/0.5ML solution pen-injector Inject  under the skin into the appropriate area as directed 1 (One) Time Per Week.  WEDNESDAYS      • traMADol (ULTRAM) 50 MG tablet Take 50 mg by mouth 2 (Two) Times a Day As Needed.      , Scheduled Meds:  sodium chloride, 10 mL, Intravenous, Q12H    , Continuous Infusions:  heparin, 12 Units/kg/hr, Last Rate: 22 Units/kg/hr (11/06/22 6888)     and Allergies:  Patient has no known allergies.    Objective     Vital Signs   Temp:  [98.5 °F (36.9 °C)] 98.5 °F (36.9 °C)  Heart Rate:  [80-86] 81  Resp:  [16-17] 16  BP: ()/(50-63) 101/63    PPE used per hospital policy    Physical Exam:  Constitutional: Not in acute distress.  Eyes: Injected conjunctivae, EOMI. pupils equal reactive to light.  ENMT: Arnold 3.  Moist tongue.  External ears normal.  Neck:  Trachea midline. No thyromegaly  Heart: RRR, no murmur  Lungs: Equal but diminished air entry bilaterally.  No audible crackles or wheezing..  Non labored breathing.   Abdomen: Obese. Soft. No tenderness or dullness. No HSM.  Extremities: No cyanosis, clubbing or pitting edema.  Warm extremities and well-perfused.  Neuro: Conscious, alert, oriented x3.  Strength 5/5 in arms.  Psych: Appropriate mood and affect.    Integumentary: No rash.  Normal skin turgor  Lymphatic: No palpable cervical or supraclavicular lymph nodes      Assessment   1. Acute bilateral PE, probably provoked due to immobilization and recent surgery  2. Dyspnea secondary to above  3. Elevated troponin, secondary to cardiac strain from PE VS non-STEMI type II  4. RV strain on CT chest  5. Transient hypotension  6. Abnormal EKG: T wave inversion in lead V2 (old) in V3 ( new)  7. ENID, on Pap    Pertinent medical problems:  · CKD stage III  · Family history of factor V Leiden  · Cervical stenosis and radiculopathy, s/p anterior cervical discectomy C5-C6 and C6/C7 cervical fusion 10/26  · DM type II  · HTN  · Hypothyroidism  · Osteoarthritis    Recommendations:    · 1L NS x1-hypotension corrected.  Etiology unclear but could be secondary to pain medications.  Doubt secondary to PE  as patient was not hypotensive at the other facility.  Home BP meds.  · Heparin drip.  Check factor V Leiden.  Doppler of the lower extremities. Echo to evaluate RV strain.  Unfortunately, the CT images are not available to determine whether patient would be a candidate for embolectomy/EKOS thrombolysis.   We could obtain the images from [facility] or perform the CTA again here but this carries a risk of worsening kidney function in the setting of CKD.  Since patient is hemodynamically stable, we will attempt to obtain the images from the other facility instead.  · Follow-up troponin and EKG.  Cardiology consulted  · Oxygen by NC and titrate keep SPO2 >90%  · Tramadol as needed for pain  · Hold antihyperglycemic and initiate insulin subcu per protocol    I discussed the patient's findings and my recommendations with patient and nursing staff    Yeni Green MD  11/06/22  23:04 EST    Time: Critical care 35 min

## 2022-11-08 ENCOUNTER — APPOINTMENT (OUTPATIENT)
Dept: CARDIOLOGY | Facility: HOSPITAL | Age: 72
End: 2022-11-08

## 2022-11-08 LAB
ANION GAP SERPL CALCULATED.3IONS-SCNC: 10 MMOL/L (ref 5–15)
APTT PPP: 27.2 SECONDS (ref 22.7–35.4)
APTT PPP: 31 SECONDS (ref 22.7–35.4)
APTT PPP: 93.8 SECONDS (ref 22.7–35.4)
BASOPHILS # BLD AUTO: 0.01 10*3/MM3 (ref 0–0.2)
BASOPHILS NFR BLD AUTO: 0.2 % (ref 0–1.5)
BH CV LOW VAS LEFT PERONEAL VESSEL: 1
BH CV LOW VAS LEFT POSTERIOR TIBIAL VESSEL: 1
BH CV LOW VAS LEFT SOLEAL VESSEL: 1
BH CV LOW VAS RIGHT DISTAL FEMORAL SPONT: 1
BH CV LOW VAS RIGHT GASTRONEMIUS VESSEL: 1
BH CV LOW VAS RIGHT PERONEAL VESSEL: 1
BH CV LOW VAS RIGHT POPLITEAL SPONT: 1
BH CV LOW VAS RIGHT POSTERIOR TIBIAL VESSEL: 1
BH CV LOW VAS RIGHT SOLEAL VESSEL: 1
BH CV LOWER VASCULAR LEFT COMMON FEMORAL AUGMENT: NORMAL
BH CV LOWER VASCULAR LEFT COMMON FEMORAL COMPETENT: NORMAL
BH CV LOWER VASCULAR LEFT COMMON FEMORAL COMPRESS: NORMAL
BH CV LOWER VASCULAR LEFT COMMON FEMORAL PHASIC: NORMAL
BH CV LOWER VASCULAR LEFT COMMON FEMORAL SPONT: NORMAL
BH CV LOWER VASCULAR LEFT DISTAL FEMORAL COMPRESS: NORMAL
BH CV LOWER VASCULAR LEFT GASTRONEMIUS COMPRESS: NORMAL
BH CV LOWER VASCULAR LEFT GREATER SAPH AK COMPRESS: NORMAL
BH CV LOWER VASCULAR LEFT GREATER SAPH BK COMPRESS: NORMAL
BH CV LOWER VASCULAR LEFT LESSER SAPH COMPRESS: NORMAL
BH CV LOWER VASCULAR LEFT MID FEMORAL AUGMENT: NORMAL
BH CV LOWER VASCULAR LEFT MID FEMORAL COMPETENT: NORMAL
BH CV LOWER VASCULAR LEFT MID FEMORAL COMPRESS: NORMAL
BH CV LOWER VASCULAR LEFT MID FEMORAL PHASIC: NORMAL
BH CV LOWER VASCULAR LEFT MID FEMORAL SPONT: NORMAL
BH CV LOWER VASCULAR LEFT PERONEAL COMPRESS: NORMAL
BH CV LOWER VASCULAR LEFT PERONEAL THROMBUS: NORMAL
BH CV LOWER VASCULAR LEFT POPLITEAL AUGMENT: NORMAL
BH CV LOWER VASCULAR LEFT POPLITEAL COMPETENT: NORMAL
BH CV LOWER VASCULAR LEFT POPLITEAL COMPRESS: NORMAL
BH CV LOWER VASCULAR LEFT POPLITEAL PHASIC: NORMAL
BH CV LOWER VASCULAR LEFT POPLITEAL SPONT: NORMAL
BH CV LOWER VASCULAR LEFT POSTERIOR TIBIAL COMPRESS: NORMAL
BH CV LOWER VASCULAR LEFT POSTERIOR TIBIAL THROMBUS: NORMAL
BH CV LOWER VASCULAR LEFT PROFUNDA FEMORAL COMPRESS: NORMAL
BH CV LOWER VASCULAR LEFT PROXIMAL FEMORAL COMPRESS: NORMAL
BH CV LOWER VASCULAR LEFT SAPHENOFEMORAL JUNCTION COMPRESS: NORMAL
BH CV LOWER VASCULAR LEFT SOLEAL COMPRESS: NORMAL
BH CV LOWER VASCULAR LEFT SOLEAL THROMBUS: NORMAL
BH CV LOWER VASCULAR RIGHT COMMON FEMORAL AUGMENT: NORMAL
BH CV LOWER VASCULAR RIGHT COMMON FEMORAL COMPETENT: NORMAL
BH CV LOWER VASCULAR RIGHT COMMON FEMORAL PHASIC: NORMAL
BH CV LOWER VASCULAR RIGHT COMMON FEMORAL SPONT: NORMAL
BH CV LOWER VASCULAR RIGHT DISTAL FEMORAL COMPRESS: NORMAL
BH CV LOWER VASCULAR RIGHT DISTAL FEMORAL THROMBUS: NORMAL
BH CV LOWER VASCULAR RIGHT GASTRONEMIUS COMPRESS: NORMAL
BH CV LOWER VASCULAR RIGHT GASTRONEMIUS THROMBUS: NORMAL
BH CV LOWER VASCULAR RIGHT GREATER SAPH AK COMPRESS: NORMAL
BH CV LOWER VASCULAR RIGHT GREATER SAPH BK COMPRESS: NORMAL
BH CV LOWER VASCULAR RIGHT LESSER SAPH COMPRESS: NORMAL
BH CV LOWER VASCULAR RIGHT MID FEMORAL AUGMENT: NORMAL
BH CV LOWER VASCULAR RIGHT MID FEMORAL COMPETENT: NORMAL
BH CV LOWER VASCULAR RIGHT MID FEMORAL COMPRESS: NORMAL
BH CV LOWER VASCULAR RIGHT MID FEMORAL PHASIC: NORMAL
BH CV LOWER VASCULAR RIGHT MID FEMORAL SPONT: NORMAL
BH CV LOWER VASCULAR RIGHT PERONEAL COMPRESS: NORMAL
BH CV LOWER VASCULAR RIGHT PERONEAL THROMBUS: NORMAL
BH CV LOWER VASCULAR RIGHT POPLITEAL AUGMENT: NORMAL
BH CV LOWER VASCULAR RIGHT POPLITEAL COMPRESS: NORMAL
BH CV LOWER VASCULAR RIGHT POPLITEAL PHASIC: NORMAL
BH CV LOWER VASCULAR RIGHT POPLITEAL SPONT: NORMAL
BH CV LOWER VASCULAR RIGHT POPLITEAL THROMBUS: NORMAL
BH CV LOWER VASCULAR RIGHT POSTERIOR TIBIAL COMPRESS: NORMAL
BH CV LOWER VASCULAR RIGHT POSTERIOR TIBIAL THROMBUS: NORMAL
BH CV LOWER VASCULAR RIGHT SOLEAL COMPRESS: NORMAL
BH CV LOWER VASCULAR RIGHT SOLEAL THROMBUS: NORMAL
BUN SERPL-MCNC: 29 MG/DL (ref 8–23)
BUN/CREAT SERPL: 20.7 (ref 7–25)
CALCIUM SPEC-SCNC: 8.1 MG/DL (ref 8.6–10.5)
CHLORIDE SERPL-SCNC: 106 MMOL/L (ref 98–107)
CO2 SERPL-SCNC: 22 MMOL/L (ref 22–29)
CREAT SERPL-MCNC: 1.4 MG/DL (ref 0.57–1)
DEPRECATED RDW RBC AUTO: 43.9 FL (ref 37–54)
EGFRCR SERPLBLD CKD-EPI 2021: 39.8 ML/MIN/1.73
EOSINOPHIL # BLD AUTO: 0.09 10*3/MM3 (ref 0–0.4)
EOSINOPHIL NFR BLD AUTO: 1.5 % (ref 0.3–6.2)
ERYTHROCYTE [DISTWIDTH] IN BLOOD BY AUTOMATED COUNT: 13.4 % (ref 12.3–15.4)
GLUCOSE BLDC GLUCOMTR-MCNC: 169 MG/DL (ref 70–130)
GLUCOSE BLDC GLUCOMTR-MCNC: 174 MG/DL (ref 70–130)
GLUCOSE BLDC GLUCOMTR-MCNC: 207 MG/DL (ref 70–130)
GLUCOSE BLDC GLUCOMTR-MCNC: 208 MG/DL (ref 70–130)
GLUCOSE SERPL-MCNC: 202 MG/DL (ref 65–99)
HCT VFR BLD AUTO: 25 % (ref 34–46.6)
HGB BLD-MCNC: 8.6 G/DL (ref 12–15.9)
IMM GRANULOCYTES # BLD AUTO: 0.07 10*3/MM3 (ref 0–0.05)
IMM GRANULOCYTES NFR BLD AUTO: 1.1 % (ref 0–0.5)
INR PPP: 1.06 (ref 0.9–1.1)
LYMPHOCYTES # BLD AUTO: 1.38 10*3/MM3 (ref 0.7–3.1)
LYMPHOCYTES NFR BLD AUTO: 22.4 % (ref 19.6–45.3)
MAXIMAL PREDICTED HEART RATE: 147 BPM
MCH RBC QN AUTO: 31.2 PG (ref 26.6–33)
MCHC RBC AUTO-ENTMCNC: 34.4 G/DL (ref 31.5–35.7)
MCV RBC AUTO: 90.6 FL (ref 79–97)
MONOCYTES # BLD AUTO: 0.42 10*3/MM3 (ref 0.1–0.9)
MONOCYTES NFR BLD AUTO: 6.8 % (ref 5–12)
NEUTROPHILS NFR BLD AUTO: 4.18 10*3/MM3 (ref 1.7–7)
NEUTROPHILS NFR BLD AUTO: 68 % (ref 42.7–76)
NRBC BLD AUTO-RTO: 0.2 /100 WBC (ref 0–0.2)
PLATELET # BLD AUTO: 232 10*3/MM3 (ref 140–450)
PMV BLD AUTO: 10.1 FL (ref 6–12)
POTASSIUM SERPL-SCNC: 4.1 MMOL/L (ref 3.5–5.2)
PROTHROMBIN TIME: 13.9 SECONDS (ref 11.7–14.2)
RBC # BLD AUTO: 2.76 10*6/MM3 (ref 3.77–5.28)
SODIUM SERPL-SCNC: 138 MMOL/L (ref 136–145)
STRESS TARGET HR: 125 BPM
WBC NRBC COR # BLD: 6.15 10*3/MM3 (ref 3.4–10.8)

## 2022-11-08 PROCEDURE — 85730 THROMBOPLASTIN TIME PARTIAL: CPT | Performed by: STUDENT IN AN ORGANIZED HEALTH CARE EDUCATION/TRAINING PROGRAM

## 2022-11-08 PROCEDURE — 82962 GLUCOSE BLOOD TEST: CPT

## 2022-11-08 PROCEDURE — 85730 THROMBOPLASTIN TIME PARTIAL: CPT | Performed by: INTERNAL MEDICINE

## 2022-11-08 PROCEDURE — 25010000002 HEPARIN (PORCINE) 25000-0.45 UT/250ML-% SOLUTION: Performed by: INTERNAL MEDICINE

## 2022-11-08 PROCEDURE — 85025 COMPLETE CBC W/AUTO DIFF WBC: CPT | Performed by: INTERNAL MEDICINE

## 2022-11-08 PROCEDURE — 99024 POSTOP FOLLOW-UP VISIT: CPT | Performed by: NURSE PRACTITIONER

## 2022-11-08 PROCEDURE — 85610 PROTHROMBIN TIME: CPT | Performed by: INTERNAL MEDICINE

## 2022-11-08 PROCEDURE — 63710000001 INSULIN REGULAR HUMAN PER 5 UNITS: Performed by: INTERNAL MEDICINE

## 2022-11-08 PROCEDURE — 93970 EXTREMITY STUDY: CPT

## 2022-11-08 PROCEDURE — 80048 BASIC METABOLIC PNL TOTAL CA: CPT | Performed by: INTERNAL MEDICINE

## 2022-11-08 PROCEDURE — 99232 SBSQ HOSP IP/OBS MODERATE 35: CPT | Performed by: INTERNAL MEDICINE

## 2022-11-08 PROCEDURE — 85347 COAGULATION TIME ACTIVATED: CPT

## 2022-11-08 RX ORDER — AMOXICILLIN 250 MG
2 CAPSULE ORAL 2 TIMES DAILY
Status: DISCONTINUED | OUTPATIENT
Start: 2022-11-08 | End: 2022-11-11 | Stop reason: HOSPADM

## 2022-11-08 RX ORDER — HEPARIN SODIUM 10000 [USP'U]/100ML
18 INJECTION, SOLUTION INTRAVENOUS
Status: DISCONTINUED | OUTPATIENT
Start: 2022-11-08 | End: 2022-11-09

## 2022-11-08 RX ORDER — BISACODYL 5 MG/1
5 TABLET, DELAYED RELEASE ORAL DAILY PRN
Status: DISCONTINUED | OUTPATIENT
Start: 2022-11-08 | End: 2022-11-11 | Stop reason: HOSPADM

## 2022-11-08 RX ORDER — GLIPIZIDE 5 MG/1
5 TABLET ORAL
Status: DISCONTINUED | OUTPATIENT
Start: 2022-11-08 | End: 2022-11-11 | Stop reason: HOSPADM

## 2022-11-08 RX ORDER — POLYETHYLENE GLYCOL 3350 17 G/17G
17 POWDER, FOR SOLUTION ORAL DAILY PRN
Status: DISCONTINUED | OUTPATIENT
Start: 2022-11-08 | End: 2022-11-11 | Stop reason: HOSPADM

## 2022-11-08 RX ORDER — BISACODYL 10 MG
10 SUPPOSITORY, RECTAL RECTAL DAILY PRN
Status: DISCONTINUED | OUTPATIENT
Start: 2022-11-08 | End: 2022-11-11 | Stop reason: HOSPADM

## 2022-11-08 RX ORDER — HEPARIN SODIUM 5000 [USP'U]/ML
40-80 INJECTION, SOLUTION INTRAVENOUS; SUBCUTANEOUS EVERY 6 HOURS PRN
Status: DISCONTINUED | OUTPATIENT
Start: 2022-11-08 | End: 2022-11-09

## 2022-11-08 RX ADMIN — INSULIN HUMAN 5 UNITS: 100 INJECTION, SOLUTION PARENTERAL at 17:26

## 2022-11-08 RX ADMIN — LEVOTHYROXINE SODIUM 75 MCG: 0.07 TABLET ORAL at 09:22

## 2022-11-08 RX ADMIN — TRAMADOL HYDROCHLORIDE 50 MG: 50 TABLET, COATED ORAL at 10:01

## 2022-11-08 RX ADMIN — INSULIN HUMAN 3 UNITS: 100 INJECTION, SOLUTION PARENTERAL at 13:05

## 2022-11-08 RX ADMIN — TRAMADOL HYDROCHLORIDE 50 MG: 50 TABLET, COATED ORAL at 21:02

## 2022-11-08 RX ADMIN — DOCUSATE SODIUM 50MG AND SENNOSIDES 8.6MG 2 TABLET: 8.6; 5 TABLET, FILM COATED ORAL at 12:31

## 2022-11-08 RX ADMIN — PREGABALIN 50 MG: 50 CAPSULE ORAL at 21:02

## 2022-11-08 RX ADMIN — INSULIN HUMAN 3 UNITS: 100 INJECTION, SOLUTION PARENTERAL at 00:46

## 2022-11-08 RX ADMIN — GLIPIZIDE 5 MG: 5 TABLET ORAL at 16:49

## 2022-11-08 RX ADMIN — Medication 10 ML: at 21:32

## 2022-11-08 RX ADMIN — PANTOPRAZOLE SODIUM 40 MG: 40 TABLET, DELAYED RELEASE ORAL at 06:38

## 2022-11-08 RX ADMIN — INSULIN HUMAN 5 UNITS: 100 INJECTION, SOLUTION PARENTERAL at 06:38

## 2022-11-08 RX ADMIN — HEPARIN SODIUM 18 UNITS/KG/HR: 10000 INJECTION, SOLUTION INTRAVENOUS at 14:18

## 2022-11-08 NOTE — PROGRESS NOTES
"NEUROSURGERY POST OP NOTE     LOS: 2 days   Patient Care Team:  Elysia Reich MD as PCP - General (Family Medicine)  Brian Weems MD as Consulting Physician (Cardiology)      Interval History:     EKOS delivered alteplase infused from 1343 to 2341 11/7/22. States she is breathing \"much better.\" Denies neck pain, arm pain or swallowing difficulty. Seen by Dr. Weems this am; started on 24 hrs Heparin drip. Dr. Weems plans to transition to Eliquis tomorrow.       History taken from: patient chart    Objective        Vital Signs  Temp:  [98.2 °F (36.8 °C)-100.3 °F (37.9 °C)] 98.2 °F (36.8 °C)  Heart Rate:  [77-94] 90  Resp:  [10-16] 13  BP: ()/(41-77) 121/49     Physical Exam:     AA&O x 3. Respirations even and unlabored. Wearing soft cervical collar.  Voice quality normal.   Anterior cervical incision well approximated without drainage. Steri-strips intact to incision; no tenderness, swelling or errythema to surrounding tissues.  Moving all four extremities without difficulty.      Assessment & Plan       Bilateral pulmonary embolism (HCC)    Stage 3a chronic kidney disease (HCC)    Essential hypertension    Mixed hyperlipidemia    Hypothyroidism (acquired)    Chest pain, atypical    POD 13 Anterior cervical discectomy C5-6 and C6-7 anterior cervical fusion and instrumentation by Dr. German for cervical radiculopathy secondary to C5/6, C6/7 cervical stenosis.  S/P EKOS procedure for treatment of submassive, symptomatic PE  OK to d/c soft collar.   Allow steri strips to fall off naturally.   Mobilize when okay with intensivist.   Patient was due for post op visit tomorrow. We will cancel the appointment and rearrange for a visit in 4 weeks with repeat cervical spine X-rays.       Machelle Earl, APRN  11/08/22  11:48 EST        "

## 2022-11-08 NOTE — PROGRESS NOTES
Today's bilateral lower venous Doppler preliminary report is positive for acute deep vein thrombosis in the right leg and left calf. Results given to Shelton LOBATO RN.

## 2022-11-08 NOTE — PROGRESS NOTES
"  PROGRESS NOTE  Patient Name: Alejandra Varner  Age/Sex: 72 y.o. female  : 1950  MRN: [id number]    Date of Admission: 2022  Date of Encounter Visit: 22   LOS: 2 days   Patient Care Team:  Elysia Reich MD as PCP - General (Family Medicine)  Brian Weems MD as Consulting Physician (Cardiology)    Chief Complaint: DVT with PE    Hospital course: Patient was admitted with pulmonary embolism after a cervical disc surgery.  Her daughter has factor V Leiden mutation but she was never tested for it herself.  She came in with shortness of breath, was found to have pulmonary embolism, was seen in consultation by cardiology with the plan to go for EKOS catheter placement later today.  She is awake and responsive, she is compensating on nasal cannula oxygen at 2 L/min.  She denies any significant leg discomfort.  No nausea or vomiting or GI complaints       REVIEW OF SYSTEMS:   CONSTITUTIONAL: no fever or chills  CARDIOVASCULAR: No chest pain, chest pressure or chest discomfort. No palpitations or edema.   RESPIRATORY: Shortness of breath, no hemoptysis or pleurisy.   GASTROINTESTINAL: No anorexia, nausea, vomiting or diarrhea. No abdominal pain or blood.   HEMATOLOGIC: No bleeding or bruising.     Ventilator/Non-Invasive Ventilation Settings (From admission, onward)    2 L/min nasal cannula oxygen            Vital Signs  Temp:  [98.2 °F (36.8 °C)-100.3 °F (37.9 °C)] 98.2 °F (36.8 °C)  Heart Rate:  [77-94] 90  Resp:  [10-16] 13  BP: ()/(41-77) 121/49  SpO2:  [88 %-98 %] 94 %  on  Flow (L/min):  [3] 3 Device (Oxygen Therapy): nasal cannula    Intake/Output Summary (Last 24 hours) at 2022 1012  Last data filed at 2022 0400  Gross per 24 hour   Intake 559.64 ml   Output 1825 ml   Net -1265.36 ml     Flowsheet Rows    Flowsheet Row First Filed Value   Admission Height 167.6 cm (66\") Documented at [address]228   Admission Weight 93.3 kg (205 lb 11 oz) Documented at 2022 1928 "        Body mass index is 34.52 kg/m².      11/07/22  0506 11/07/22  0906 11/08/22  0600   Weight: 94.7 kg (208 lb 12.4 oz) 91.2 kg (201 lb) 97 kg (213 lb 13.5 oz) (pt has ekos equipment on bed. recommend new weight on dc)       Physical Exam:  GEN:  No acute distress, alert, cooperative, well developed   EYES:   Sclerae clear. No icterus. PERRL. Normal EOM  ENT:   External ears/nose normal, no oral lesions, no thrush, mucous membranes moist  NECK: patient has a soft neck collar on could not check for range of motion, midline trachea, no JVD  LUNGS: Normal chest on inspection, CTAB, no wheezes. No rhonchi. No crackles. Respirations regular, even and unlabored.   CV:  Regular rhythm and rate. Normal S1/S2. No murmurs, gallops, or rubs noted.  ABD:  Soft, nontender and nondistended. Normal bowel sounds. No guarding  EXT:  Moves all extremities well. No cyanosis. No redness. nonpitting bilateral edema .   Skin: Dry, intact, no bleeding    Results Review:    Results From Last 14 Days   Lab Units 11/07/22  0609 11/07/22  0024   LACTATE mmol/L 1.4 4.9*     Results from last 7 days   Lab Units 11/08/22  0610 11/07/22  0609   SODIUM mmol/L 138 131*   POTASSIUM mmol/L 4.1 4.3   CHLORIDE mmol/L 106 96*   CO2 mmol/L 22.0 17.4*   BUN mg/dL 29* 37*   CREATININE mg/dL 1.40* 2.20*   CALCIUM mg/dL 8.1* 9.0   AST (SGOT) U/L  --  29   ALT (SGPT) U/L  --  18   ANION GAP mmol/L 10.0 17.6*   ALBUMIN g/dL  --  3.30*     Results from last 7 days   Lab Units 11/07/22  0609   TROPONIN T ng/mL 0.069*         Results from last 7 days   Lab Units 11/07/22  0609   PROBNP pg/mL 9,955.0*     Results from last 7 days   Lab Units 11/08/22  0610 11/07/22  1342 11/07/22  0609 11/06/22  2249   WBC 10*3/mm3 6.15  --  11.04* 10.62   HEMOGLOBIN g/dL 8.6*  --  10.2* 9.9*   HEMOGLOBIN, POC g/dL  --  9.5*  --   --    HEMATOCRIT % 25.0*  --  31.3* 29.1*   HEMATOCRIT POC %  --  28*  --   --    PLATELETS 10*3/mm3 232  --  325 311   MCV fL 90.6  --  96.3 92.1    NEUTROPHIL % % 68.0  --  55.0 66.5   LYMPHOCYTE % % 22.4  --  35.1 24.8   MONOCYTES % % 6.8  --  7.1 7.1   EOSINOPHIL % % 1.5  --  1.7 0.6   BASOPHIL % % 0.2  --  0.5 0.3   IMM GRAN % % 1.1*  --  0.6* 0.7*     Results from last 7 days   Lab Units 11/08/22  0610 11/07/22  1528 11/07/22  0609 11/06/22  2249   INR   --   --   --  1.20*   APTT seconds 31.0 59.8* >200.0* 87.5*               Invalid input(s): LDLCALC          Glucose   Date/Time Value Ref Range Status   11/08/2022 0544 207 (H) 70 - 130 mg/dL Final     Comment:     Meter: [id number] : [id number] White Odalys NA   11/08/2022 0005 169 (H) 70 - 130 mg/dL Final     Comment:     Meter: [id number] : [id number] White Odalys NA   11/07/2022 1753 141 (H) 70 - 130 mg/dL Final     Comment:     Meter: [id number] : [id number] Luis Sandovalley JOSÉ ANTONIO   11/07/2022 1145 134 (H) 70 - 130 mg/dL Final     Comment:     Meter: [id number] : [id number] Luis Joanna JOSÉ ANTONIO   11/07/2022 0552 99 70 - 130 mg/dL Final     Comment:     Meter: [id number] : [id number] Stefania Chandler RN   11/07/2022 0226 133 (H) 70 - 130 mg/dL Final     Comment:     Meter: [id number] : [id number] Stefania Chandler RN   11/06/2022 2332 160 (H) 70 - 130 mg/dL Final     Comment:     Meter: [id number] : [id number] Gabehart Emilee NA     Results from last 7 days   Lab Units 11/07/22  0609 11/07/22  0024   LACTATE mmol/L 1.4 4.9*                           Imaging:   Imaging Results (All)     Procedure Component Value Units Date/Time    CT Outside Films [[id number]] Resulted: 11/07/22 0832     Updated: 11/07/22 0832    Narrative:      This procedure was auto-finalized with no dictation required.          I reviewed the patient's new clinical results.  I personally viewed and interpreted the patient's imaging results:        Medication Review:   insulin regular, 0-14 Units, Subcutaneous, Q6H  levothyroxine, 75 mcg, Oral, Daily  pantoprazole, 40 mg, Oral, QAM  pregabalin, 50 mg, Oral,  Nightly  sodium chloride, 10 mL, Intravenous, Q12H        heparin, 18 Units/kg/hr        ASSESSMENT:   Acute bilateral PE, probably provoked due to immobilization and recent surgery  Dyspnea secondary to above  Elevated troponin, secondary to cardiac strain from PE VS non-STEMI type II  RV strain on CT chest  Transient hypotension  Abnormal EKG: T wave inversion in lead V2 (old) in V3 ( new)  ENID, on Pap  Diabetes mellitus with hyperglycemia, will resume home regimen of glipizide  CKD stage III with acute renal failure, creatinine is trending back down  Family history of factor V Leiden  Cervical stenosis and radiculopathy, s/p anterior cervical discectomy C5-C6 and C6/C7 cervical fusion 10/26, will hold on the oral anticoagulation until okay with neurosurgery  DM type II  HTN  Hypothyroidism  Osteoarthritis    PLAN:  Status post EKOS with heparin drip and thrombolysis  Per neurosurgery they would prefer to use the heparin drip for the next 24 hours before starting oral anticoagulation given her recent neck surgery  Renal function is looking a lot better creatinine about 1.4, lactic acid is down to normal.  Slight elevation of troponin is likely due to the pulmonary embolism.  White count is normal hemoglobin did drop down some and we will continue to track.  Glycemic control was acceptable with only elevated numbers in the 200 range this morning, patient will be maintained on the sliding scale.  We will resume her glipizide oral regimen that she usually takes at home for more optimal glycemic control      Discussed with the cardiology team, discussed with the patient, discussed with the CCU rounding team  Will monitor for any sign of bleeding while on the heparin drip    Disposition:     Jessica Ibrahim MD  11/08/22  10:12 EST           Dictated utilizing Dragon dictation

## 2022-11-08 NOTE — PLAN OF CARE
Problem: Adult Inpatient Plan of Care  Goal: Plan of Care Review  Outcome: Ongoing, Progressing  Flowsheets (Taken 11/8/2022 2549)  Progress: improving  Plan of Care Reviewed With: patient  Outcome Evaluation: In CCU. Sheaths removed. Doppler positive for acute DVTs. On heparin drip. No c/o pain or discomfort.

## 2022-11-08 NOTE — PROGRESS NOTES
"Alejandra Varner  1950 72 y.o.  [id number]      Patient Care Team:  Elysia Reich MD as PCP - General (Family Medicine)  Brian Weems MD as Consulting Physician (Cardiology)    CC: Cervical spine surgery 12 days ago, shortness of breath began 4 days ago but severe shortness of breath 2 days ago, submassive pulmonary embolus, s/p EKOS    Interval History: Feeling better      Objective   Vital Signs  Temp:  [98.2 °F (36.8 °C)-100.3 °F (37.9 °C)] 98.2 °F (36.8 °C)  Heart Rate:  [77-94] 90  Resp:  [10-16] 13  BP: ()/(41-87) 121/49    Intake/Output Summary (Last 24 hours) at 11/8/2022 0858  Last data filed at 11/8/2022 0400  Gross per 24 hour   Intake 559.64 ml   Output 1825 ml   Net -1265.36 ml     Flowsheet Rows    Flowsheet Row First Filed Value   Admission Height 167.6 cm (66\") Documented at 11/06/2022 1928   Admission Weight 93.3 kg (205 lb 11 oz) Documented at 11/06/2022 1928          Physical Exam:   General Appearance:    Alert,oriented, in no acute distress   Lungs:     Clear to auscultation,BS are equal    Heart:    Normal S1 and S2, RRR without murmur, gallop or rub   HEENT:    Sclerae are clear, no JVD or adenopathy   Abdomen:     Normal bowel sounds, soft nontender, nondistended, no HSM   Extremities:   Moves all extremities well, no edema, no cyanosis, no             Redness, no rash     Medication Review:      insulin regular, 0-14 Units, Subcutaneous, Q6H  levothyroxine, 75 mcg, Oral, Daily  pantoprazole, 40 mg, Oral, QAM  pregabalin, 50 mg, Oral, Nightly  sodium chloride, 10 mL, Intravenous, Q12H      alteplase (ACTIVASE) 10 mg in 0.9% NaCl 250 mL- Cathflow for Angiography, 0.5 mg/hr, Last Rate: Stopped (11/07/22 5156)  alteplase (ACTIVASE) 10 mg in 0.9% NaCl 250 mL- Cathflow for Angiography, 0.5 mg/hr, Last Rate: Stopped (11/07/22 1405)  alteplase (ACTIVASE) 10 mg in 0.9% NaCl 250 mL- Cathflow for Angiography, 1 mg/hr, Last Rate: 1 mg/hr (11/07/22 1342)  heparin, 400 Units/hr, Last " Rate: 400 Units/hr (11/07/22 1341)  phenylephrine, 0.5-3 mcg/kg/min, Last Rate: 0.1 mcg/kg/min (11/07/22 1248)  sodium chloride, 35 mL/hr, Last Rate: 35 mL/hr (11/07/22 1342)          I reviewed the patient's new clinical results.  I personally viewed and interpreted the patient's EKG/Telemetry data    Assessment/Plan  Active Hospital Problems    Diagnosis  POA   • **Bilateral pulmonary embolism (HCC) [I26.99]  Yes     Priority: High   • Chest pain, atypical [R07.89]  Unknown   • Stage 3a chronic kidney disease (HCC) [N18.31]  Yes   • Essential hypertension [I10]  Yes   • Mixed hyperlipidemia [E78.2]  Yes   • Hypothyroidism (acquired) [E03.9]  Yes      Resolved Hospital Problems   No resolved problems to display.       She has a submassive pulmonary emboli that interestingly both PEs are not in the main pulmonary artery they are more segmental.  She has a markedly enlarged RV and elevated troponin and BNP consistent with a high risk pulmonary embolus.  She is s/p EKOS.  She is a little bit better I removed her catheters we will get her sheaths out I think we will give her 24 hours of heparin and then switch over to Eliquis.  She could maybe move out of the CCU today    Brian Weems MD  11/08/22  08:58 EST

## 2022-11-09 ENCOUNTER — TELEPHONE (OUTPATIENT)
Dept: NEUROSURGERY | Facility: CLINIC | Age: 72
End: 2022-11-09

## 2022-11-09 DIAGNOSIS — M48.02 CERVICAL SPINAL STENOSIS: Primary | ICD-10-CM

## 2022-11-09 LAB
ANION GAP SERPL CALCULATED.3IONS-SCNC: 9 MMOL/L (ref 5–15)
APTT PPP: 128.1 SECONDS (ref 22.7–35.4)
BASOPHILS # BLD AUTO: 0.02 10*3/MM3 (ref 0–0.2)
BASOPHILS NFR BLD AUTO: 0.3 % (ref 0–1.5)
BUN SERPL-MCNC: 17 MG/DL (ref 8–23)
BUN/CREAT SERPL: 14.3 (ref 7–25)
CALCIUM SPEC-SCNC: 8.8 MG/DL (ref 8.6–10.5)
CHLORIDE SERPL-SCNC: 105 MMOL/L (ref 98–107)
CO2 SERPL-SCNC: 25 MMOL/L (ref 22–29)
CREAT SERPL-MCNC: 1.19 MG/DL (ref 0.57–1)
DEPRECATED RDW RBC AUTO: 43.8 FL (ref 37–54)
EGFRCR SERPLBLD CKD-EPI 2021: 48.4 ML/MIN/1.73
EOSINOPHIL # BLD AUTO: 0.24 10*3/MM3 (ref 0–0.4)
EOSINOPHIL NFR BLD AUTO: 3.2 % (ref 0.3–6.2)
ERYTHROCYTE [DISTWIDTH] IN BLOOD BY AUTOMATED COUNT: 13.3 % (ref 12.3–15.4)
GLUCOSE BLDC GLUCOMTR-MCNC: 117 MG/DL (ref 70–130)
GLUCOSE BLDC GLUCOMTR-MCNC: 120 MG/DL (ref 70–130)
GLUCOSE BLDC GLUCOMTR-MCNC: 128 MG/DL (ref 70–130)
GLUCOSE BLDC GLUCOMTR-MCNC: 171 MG/DL (ref 70–130)
GLUCOSE BLDC GLUCOMTR-MCNC: 196 MG/DL (ref 70–130)
GLUCOSE SERPL-MCNC: 216 MG/DL (ref 65–99)
HCT VFR BLD AUTO: 25.9 % (ref 34–46.6)
HGB BLD-MCNC: 8.9 G/DL (ref 12–15.9)
IMM GRANULOCYTES # BLD AUTO: 0.06 10*3/MM3 (ref 0–0.05)
IMM GRANULOCYTES NFR BLD AUTO: 0.8 % (ref 0–0.5)
LYMPHOCYTES # BLD AUTO: 2.04 10*3/MM3 (ref 0.7–3.1)
LYMPHOCYTES NFR BLD AUTO: 27.1 % (ref 19.6–45.3)
MCH RBC QN AUTO: 30.9 PG (ref 26.6–33)
MCHC RBC AUTO-ENTMCNC: 34.4 G/DL (ref 31.5–35.7)
MCV RBC AUTO: 89.9 FL (ref 79–97)
MONOCYTES # BLD AUTO: 0.47 10*3/MM3 (ref 0.1–0.9)
MONOCYTES NFR BLD AUTO: 6.2 % (ref 5–12)
NEUTROPHILS NFR BLD AUTO: 4.71 10*3/MM3 (ref 1.7–7)
NEUTROPHILS NFR BLD AUTO: 62.4 % (ref 42.7–76)
NRBC BLD AUTO-RTO: 0.1 /100 WBC (ref 0–0.2)
PLATELET # BLD AUTO: 269 10*3/MM3 (ref 140–450)
PMV BLD AUTO: 10.4 FL (ref 6–12)
POTASSIUM SERPL-SCNC: 4.4 MMOL/L (ref 3.5–5.2)
RBC # BLD AUTO: 2.88 10*6/MM3 (ref 3.77–5.28)
SODIUM SERPL-SCNC: 139 MMOL/L (ref 136–145)
WBC NRBC COR # BLD: 7.54 10*3/MM3 (ref 3.4–10.8)

## 2022-11-09 PROCEDURE — 85730 THROMBOPLASTIN TIME PARTIAL: CPT | Performed by: INTERNAL MEDICINE

## 2022-11-09 PROCEDURE — 25010000002 HEPARIN (PORCINE) 25000-0.45 UT/250ML-% SOLUTION: Performed by: INTERNAL MEDICINE

## 2022-11-09 PROCEDURE — 99024 POSTOP FOLLOW-UP VISIT: CPT | Performed by: NURSE PRACTITIONER

## 2022-11-09 PROCEDURE — 85025 COMPLETE CBC W/AUTO DIFF WBC: CPT | Performed by: INTERNAL MEDICINE

## 2022-11-09 PROCEDURE — 99232 SBSQ HOSP IP/OBS MODERATE 35: CPT | Performed by: INTERNAL MEDICINE

## 2022-11-09 PROCEDURE — 63710000001 INSULIN REGULAR HUMAN PER 5 UNITS: Performed by: INTERNAL MEDICINE

## 2022-11-09 PROCEDURE — 82962 GLUCOSE BLOOD TEST: CPT

## 2022-11-09 PROCEDURE — 80048 BASIC METABOLIC PNL TOTAL CA: CPT | Performed by: INTERNAL MEDICINE

## 2022-11-09 RX ADMIN — APIXABAN 10 MG: 5 TABLET, FILM COATED ORAL at 20:23

## 2022-11-09 RX ADMIN — INSULIN HUMAN 3 UNITS: 100 INJECTION, SOLUTION PARENTERAL at 11:47

## 2022-11-09 RX ADMIN — INSULIN HUMAN 3 UNITS: 100 INJECTION, SOLUTION PARENTERAL at 17:00

## 2022-11-09 RX ADMIN — Medication 10 ML: at 08:04

## 2022-11-09 RX ADMIN — TRAMADOL HYDROCHLORIDE 50 MG: 50 TABLET, COATED ORAL at 08:08

## 2022-11-09 RX ADMIN — APIXABAN 10 MG: 5 TABLET, FILM COATED ORAL at 14:50

## 2022-11-09 RX ADMIN — TRAMADOL HYDROCHLORIDE 50 MG: 50 TABLET, COATED ORAL at 20:25

## 2022-11-09 RX ADMIN — GLIPIZIDE 5 MG: 5 TABLET ORAL at 16:41

## 2022-11-09 RX ADMIN — PREGABALIN 50 MG: 50 CAPSULE ORAL at 20:25

## 2022-11-09 RX ADMIN — PANTOPRAZOLE SODIUM 40 MG: 40 TABLET, DELAYED RELEASE ORAL at 06:37

## 2022-11-09 RX ADMIN — GLIPIZIDE 5 MG: 5 TABLET ORAL at 06:37

## 2022-11-09 RX ADMIN — HEPARIN SODIUM 18 UNITS/KG/HR: 10000 INJECTION, SOLUTION INTRAVENOUS at 02:50

## 2022-11-09 RX ADMIN — LEVOTHYROXINE SODIUM 75 MCG: 0.07 TABLET ORAL at 08:04

## 2022-11-09 RX ADMIN — DOCUSATE SODIUM 50MG AND SENNOSIDES 8.6MG 2 TABLET: 8.6; 5 TABLET, FILM COATED ORAL at 20:25

## 2022-11-09 RX ADMIN — Medication 10 ML: at 20:25

## 2022-11-09 RX ADMIN — DOCUSATE SODIUM 50MG AND SENNOSIDES 8.6MG 2 TABLET: 8.6; 5 TABLET, FILM COATED ORAL at 08:04

## 2022-11-09 NOTE — PROGRESS NOTES
[facility] NEUROSURGERY PROGRESS NOTE      CC: POD #14 from ACDF C5-7      Subjective     Interval History:  No events reported overnight.  She is sitting on the edge of her be talking with family. She states that she feels better today, although she still has some pain with/from coughing.  She states that she has made her throat sore, and her abdominal muscles are sore.  She is moving all extremities, denies any trouble swallowing.     ROS:  Constitutional: No fever, chills  Neck: no neck pain  GI: No nausea, vomiting, no swallow difficulties  Neuro: No numbness, tingling, or weakness  : no difficulty voiding-f/c    Objective     Vital signs in last 24 hours:  Temp:  [98.1 °F (36.7 °C)-99.4 °F (37.4 °C)] 98.1 °F (36.7 °C)  Heart Rate:  [] 105  Resp:  [11-13] 11  BP: (102-155)/(51-81) 153/72    Intake/Output this shift:  I/O this shift:  In: 360 [P.O.:360]  Out: -     LABS:  Results from last 7 days   Lab Units 11/09/22  0458 11/08/22  0610 11/07/22  1342 11/07/22  0609   WBC 10*3/mm3 7.54 6.15  --  11.04*   HEMOGLOBIN g/dL 8.9* 8.6*  --  10.2*   HEMOGLOBIN, POC g/dL  --   --  9.5*  --    HEMATOCRIT % 25.9* 25.0*  --  31.3*   HEMATOCRIT POC %  --   --  28*  --    PLATELETS 10*3/mm3 269 232  --  325     Results from last 7 days   Lab Units 11/09/22  1023 11/08/22  0610 11/07/22  0609   SODIUM mmol/L 139 138 131*   POTASSIUM mmol/L 4.4 4.1 4.3   CHLORIDE mmol/L 105 106 96*   CO2 mmol/L 25.0 22.0 17.4*   BUN mg/dL 17 29* 37*   CREATININE mg/dL 1.19* 1.40* 2.20*   CALCIUM mg/dL 8.8 8.1* 9.0   BILIRUBIN mg/dL  --   --  0.5   ALK PHOS U/L  --   --  100   ALT (SGPT) U/L  --   --  18   AST (SGOT) U/L  --   --  29   GLUCOSE mg/dL 216* 202* 106*     Results from last 7 days   Lab Units 11/08/22  0610 11/06/22  2249   INR  1.06 1.20*       IMAGING STUDIES:  No new imaging    Meds reviewed/changed: Yes    Current Facility-Administered Medications:   •  acetaminophen (TYLENOL) tablet 650 mg, 650 mg, Oral, Q4H PRN, Patrice,  Jessica MARIE MD  •  apixaban (ELIQUIS) tablet 10 mg, 10 mg, Oral, Q12H **FOLLOWED BY** [START ON 11/16/2022] apixaban (ELIQUIS) tablet 5 mg, 5 mg, Oral, Q12H, Jessica Ibrahim MD  •  sennosides-docusate (PERICOLACE) 8.6-50 MG per tablet 2 tablet, 2 tablet, Oral, BID, 2 tablet at 11/09/22 0804 **AND** polyethylene glycol (MIRALAX) packet 17 g, 17 g, Oral, Daily PRN **AND** bisacodyl (DULCOLAX) EC tablet 5 mg, 5 mg, Oral, Daily PRN **AND** bisacodyl (DULCOLAX) suppository 10 mg, 10 mg, Rectal, Daily PRN, Jessica Ibrahim MD  •  cyclobenzaprine (FLEXERIL) tablet 5 mg, 5 mg, Oral, Nightly PRN, Jessica Ibrahim MD  •  dextrose (D50W) (25 g/50 mL) IV injection 25 g, 25 g, Intravenous, Q15 Min PRN, Jessica Ibrahim MD  •  dextrose (GLUTOSE) oral gel 15 g, 15 g, Oral, Q15 Min PRN, Jessica Ibrahim MD  •  glipizide (GLUCOTROL) tablet 5 mg, 5 mg, Oral, BID AC, Jessica Ibrahim MD, 5 mg at 11/09/22 0637  •  glucagon (human recombinant) (GLUCAGEN DIAGNOSTIC) injection 1 mg, 1 mg, Intramuscular, Q15 Min PRN, Jessica Ibrahim MD  •  HYDROcodone-acetaminophen (NORCO) 5-325 MG per tablet 1 tablet, 1 tablet, Oral, Q4H PRN, Jessica Ibrahim MD  •  insulin regular (humuLIN R,novoLIN R) injection 0-14 Units, 0-14 Units, Subcutaneous, Q6H, Jessica Ibrahim MD, 3 Units at 11/09/22 1147  •  levothyroxine (SYNTHROID, LEVOTHROID) tablet 75 mcg, 75 mcg, Oral, Daily, Jessica Ibrahim MD, 75 mcg at 11/09/22 0804  •  ondansetron (ZOFRAN) tablet 4 mg, 4 mg, Oral, Q6H PRN **OR** ondansetron (ZOFRAN) injection 4 mg, 4 mg, Intravenous, Q6H PRN, Jessica Ibrahim MD  •  pantoprazole (PROTONIX) EC tablet 40 mg, 40 mg, Oral, QAM, Jessica Ibrahim MD, 40 mg at 11/09/22 0637  •  pregabalin (LYRICA) capsule 50 mg, 50 mg, Oral, Nightly, Jessica Ibrahim MD, 50 mg at 11/08/22 2102  •  sodium chloride 0.9 % flush 10 mL, 10 mL, Intravenous, Q12H, Jessica Ibrahim MD, 10 mL at 11/09/22 0804  •  sodium chloride 0.9 % flush 10 mL, 10 mL, Intravenous, PRN, Jessica Ibrahim MD  •   "traMADol (ULTRAM) tablet 50 mg, 50 mg, Oral, BID PRN, Jessica Ibrahim MD, 50 mg at 11/09/22 0808      Physical Exam:    General:   Awake, alert, oriented x3. Speech clear with no aphasia  HEENT:    Atraumatic, normocephalic  Neck:    soft collar On ; ROM limited r/t recent surgery/soft collar; swallow/trachea midline; incision Right anterior incision with steri strips, no drainage, mild edema, soft collar on  Motor: Normal muscle strength, bulk and tone in upper and lower extremities.  No fasciculations, rigidity, spasticity, or abnormal movements.  Sensation: Normal to light touch  Coordination: Moving all extremities with equal strength  Station and Gait:             Normal gait and station.   Extremities:   SCDs in place    Assessment & Plan     ASSESSMENT:      Bilateral pulmonary embolism (HCC)    Stage 3a chronic kidney disease (HCC)    Essential hypertension    Mixed hyperlipidemia    Hypothyroidism (acquired)    Chest pain, atypical        PLAN:   Explained to brace her abdomen with a pillow with coughing  Ordered a butterfly pillow   Continue to monitor incision for drainage  Ok to d/c soft collar  We will sign off and see in 4 weeks for f/up.  Please feel free to call with any questions, or concerns that may arise.     I discussed the patient's findings and my recommendations with patient, family and Dr. German       LOS: 3 days       Danielle Mukherjee, APRN  11/9/2022  14:37 EST    \"Dictated utilizing Dragon dictation\".      "

## 2022-11-09 NOTE — TELEPHONE ENCOUNTER
----- Message from KERWIN Archer sent at 11/8/2022  2:22 PM EST -----  Regarding: please schedule  Please arrange follow up appointment for patient with Dr. German in 4 weeks with AP and lateral cervical spine X-rays. Please contact patient with date and time of appointment.     THANK YOU

## 2022-11-09 NOTE — PROGRESS NOTES
PROGRESS NOTE  Patient Name: Alejandra Varner  Age/Sex: 72 y.o. female  : 1950  MRN: [id number]    Date of Admission: 2022  Date of Encounter Visit: 22   LOS: 3 days   Patient Care Team:  Elysia Reich MD as PCP - General (Family Medicine)  Brian Weems MD as Consulting Physician (Cardiology)    Chief Complaint: DVT with PE    Hospital course: Patient was admitted with pulmonary embolism after a cervical disc surgery.  Her daughter has factor V Leiden mutation but she was never tested for it herself.  She came in with shortness of breath, was found to have pulmonary embolism, was seen in consultation by cardiology had an EKOS catheter which was placed and she got the treatment with the tPA and heparin and later removed.  Patient was kept on the heparin for 24 hours afterwards given the concern of the neurosurgical team and she is cleared to be transferred to the oral anticoagulation today.  This was already discussed with the cardiology team who is managing her anticoagulation  She is on 2 L nasal cannula and she is having further improvement in her shortness of breath  She had factor V Leiden test sent out and will waiting on the results since her daughter tested positive for the genetic mutation  Patient herself did not have any DVT after her knee surgery 2-1/2 years ago.    REVIEW OF SYSTEMS:   CONSTITUTIONAL: no fever or chills  CARDIOVASCULAR: No chest pain, chest pressure or chest discomfort. No palpitations or edema.   RESPIRATORY: Shortness of breath, no hemoptysis or pleurisy.   GASTROINTESTINAL: No anorexia, nausea, vomiting or diarrhea. No abdominal pain or blood.   HEMATOLOGIC: No bleeding or bruising.     Ventilator/Non-Invasive Ventilation Settings (From admission, onward)    2 L/min nasal cannula oxygen            Vital Signs  Temp:  [98.1 °F (36.7 °C)-99.4 °F (37.4 °C)] 98.1 °F (36.7 °C)  Heart Rate:  [] 111  Resp:  [11-13] 11  BP: (102-155)/(51-81) 154/73  SpO2:   "[80 %-100 %] 94 %  on  Flow (L/min):  [2-3] 2 Device (Oxygen Therapy): room air    Intake/Output Summary (Last 24 hours) at 11/9/2022 1318  Last data filed at 11/9/2022 1242  Gross per 24 hour   Intake 1133.7 ml   Output 2225 ml   Net -1091.3 ml     Flowsheet Rows    Flowsheet Row First Filed Value   Admission Height 167.6 cm (66\") Documented at 11/06/2022 1928   Admission Weight 93.3 kg (205 lb 11 oz) Documented at 11/06/2022 1928        Body mass index is 34.98 kg/m².      11/07/22  0906 11/08/22 0600 11/09/22 0600   Weight: 91.2 kg (201 lb) 97 kg (213 lb 13.5 oz) (pt has ekos equipment on bed. recommend new weight on dc) 98.3 kg (216 lb 11.4 oz)       Physical Exam:  GEN:  No acute distress, alert, cooperative, well developed   EYES:   Sclerae clear. No icterus. PERRL. Normal EOM  ENT:   External ears/nose normal, no oral lesions, no thrush, mucous membranes moist  NECK: patient has a soft neck collar on could not check for range of motion, midline trachea, no JVD  LUNGS: Normal chest on inspection, CTAB, no wheezes. No rhonchi. No crackles. Respirations regular, even and unlabored.   CV:  Regular rhythm and rate. Normal S1/S2. No murmurs, gallops, or rubs noted.  ABD:  Soft, nontender and nondistended. Normal bowel sounds. No guarding  EXT:  Moves all extremities well. No cyanosis. No redness. nonpitting bilateral edema .   Skin: Dry, intact, no bleeding    Results Review:    Results From Last 14 Days   Lab Units 11/07/22  0609 11/07/22  0024   LACTATE mmol/L 1.4 4.9*     Results from last 7 days   Lab Units 11/09/22  1023 11/08/22  0610 11/07/22  0609   SODIUM mmol/L 139 138 131*   POTASSIUM mmol/L 4.4 4.1 4.3   CHLORIDE mmol/L 105 106 96*   CO2 mmol/L 25.0 22.0 17.4*   BUN mg/dL 17 29* 37*   CREATININE mg/dL 1.19* 1.40* 2.20*   CALCIUM mg/dL 8.8 8.1* 9.0   AST (SGOT) U/L  --   --  29   ALT (SGPT) U/L  --   --  18   ANION GAP mmol/L 9.0 10.0 17.6*   ALBUMIN g/dL  --   --  3.30*     Results from last 7 days "   Lab Units 11/07/22  0609   TROPONIN T ng/mL 0.069*         Results from last 7 days   Lab Units 11/07/22  0609   PROBNP pg/mL 9,955.0*     Results from last 7 days   Lab Units 11/09/22  0458 11/08/22  0610 11/07/22  1342 11/07/22  0609 11/06/22  2249   WBC 10*3/mm3 7.54 6.15  --  11.04* 10.62   HEMOGLOBIN g/dL 8.9* 8.6*  --  10.2* 9.9*   HEMOGLOBIN, POC g/dL  --   --  9.5*  --   --    HEMATOCRIT % 25.9* 25.0*  --  31.3* 29.1*   HEMATOCRIT POC %  --   --  28*  --   --    PLATELETS 10*3/mm3 269 232  --  325 311   MCV fL 89.9 90.6  --  96.3 92.1   NEUTROPHIL % % 62.4 68.0  --  55.0 66.5   LYMPHOCYTE % % 27.1 22.4  --  35.1 24.8   MONOCYTES % % 6.2 6.8  --  7.1 7.1   EOSINOPHIL % % 3.2 1.5  --  1.7 0.6   BASOPHIL % % 0.3 0.2  --  0.5 0.3   IMM GRAN % % 0.8* 1.1*  --  0.6* 0.7*     Results from last 7 days   Lab Units 11/09/22  0458 11/08/22  2059 11/08/22  1037 11/08/22  0610 11/07/22  1528 11/07/22  0609 11/06/22  2249   INR   --   --   --  1.06  --   --  1.20*   APTT seconds 128.1* 93.8* 27.2 31.0 59.8* >200.0* 87.5*               Invalid input(s): LDLCALC          Glucose   Date/Time Value Ref Range Status   11/09/2022 1127 196 (H) 70 - 130 mg/dL Final     Comment:     Meter: [id number] : [id number] Renetta Aguilar KOLTON   11/09/2022 0543 128 70 - 130 mg/dL Final     Comment:     Meter: [id number] : [id number] Mary Alice CHOU   11/09/2022 0004 117 70 - 130 mg/dL Final     Comment:     Meter: [id number] : [id number] Mary Alice Dumont JOSÉ ANTONIO   11/08/2022 1724 208 (H) 70 - 130 mg/dL Final     Comment:     Meter: [id number] : [id number] Renetta Aguilar NA   11/08/2022 1233 174 (H) 70 - 130 mg/dL Final     Comment:     Meter: [id number] : [id number] Pacheco Umer NA   11/08/2022 0544 207 (H) 70 - 130 mg/dL Final     Comment:     Meter: [id number] : [id number] White Odalys NA   11/08/2022 0005 169 (H) 70 - 130 mg/dL Final     Comment:     Meter: [id number] : [id number] White Odalys NA    11/07/2022 1753 141 (H) 70 - 130 mg/dL Final     Comment:     Meter: [id number] : [id number] Luis Marshall JOSÉ ANTONIO     Results from last 7 days   Lab Units 11/07/22  0609 11/07/22  0024   LACTATE mmol/L 1.4 4.9*                           Imaging:   Imaging Results (All)     Procedure Component Value Units Date/Time    CT Outside Films [[id number]] Resulted: 11/07/22 0832     Updated: 11/07/22 0832    Narrative:      This procedure was auto-finalized with no dictation required.          I reviewed the patient's new clinical results.  I personally viewed and interpreted the patient's imaging results:        Medication Review:   apixaban, 10 mg, Oral, Q12H   Followed by  [START ON 11/16/2022] apixaban, 5 mg, Oral, Q12H  glipizide, 5 mg, Oral, BID AC  insulin regular, 0-14 Units, Subcutaneous, Q6H  levothyroxine, 75 mcg, Oral, Daily  pantoprazole, 40 mg, Oral, QAM  pregabalin, 50 mg, Oral, Nightly  senna-docusate sodium, 2 tablet, Oral, BID  sodium chloride, 10 mL, Intravenous, Q12H             ASSESSMENT:   Acute bilateral PE, probably provoked due to immobilization and recent surgery  Dyspnea secondary to above  Elevated troponin, secondary to cardiac strain from PE VS non-STEMI type II  RV strain on CT chest  Acute DVT of the right in the right distal femoral popliteal posterior tibial and peroneal gastrocnemius and soleal veins, positive for acute DVT in the posterior tibial and peroneal and soleal only.  Transient hypotension  Abnormal EKG: T wave inversion in lead V2 (old) in V3 ( new)  ENID, on Pap  CKD stage III  Family history of factor V Leiden  Cervical stenosis and radiculopathy, s/p anterior cervical discectomy C5-C6 and C6/C7 cervical fusion 10/26  DM type II  HTN  Hypothyroidism  Osteoarthritis    PLAN:  Doing well, we will continue to follow while in the CCU  Patient was under the care of the internal medicine team who will follow up on her after transfer out  She  will be cleared for transfer  out    Discussed with CCU rounding team    Disposition:     Jessica Ibrahim MD  11/09/22  13:18 EST           Dictated utilizing Dragon dictation

## 2022-11-09 NOTE — PROGRESS NOTES
"Alejandra Varner  1950 72 y.o.  [id number]      Patient Care Team:  Elysia Reich MD as PCP - General (Family Medicine)  Brian Weems MD as Consulting Physician (Cardiology)    CC: Cervical spine surgery 12 days ago, shortness of breath began 4 days ago but severe shortness of breath 2 days ago, submassive pulmonary embolus, s/p EKOS    Interval History: Feeling better      Objective   Vital Signs  Temp:  [98.1 °F (36.7 °C)-99.4 °F (37.4 °C)] 98.1 °F (36.7 °C)  Heart Rate:  [] 111  Resp:  [11-13] 11  BP: (102-155)/(51-81) 154/73    Intake/Output Summary (Last 24 hours) at 11/9/2022 1244  Last data filed at 11/9/2022 1242  Gross per 24 hour   Intake 1133.7 ml   Output 2225 ml   Net -1091.3 ml     Flowsheet Rows    Flowsheet Row First Filed Value   Admission Height 167.6 cm (66\") Documented at 11/06/2022 1928   Admission Weight 93.3 kg (205 lb 11 oz) Documented at 11/06/2022 1928          Physical Exam:   General Appearance:    Alert,oriented, in no acute distress   Lungs:     Clear to auscultation,BS are equal    Heart:    Normal S1 and S2, RRR without murmur, gallop or rub   HEENT:    Sclerae are clear, no JVD or adenopathy   Abdomen:     Normal bowel sounds, soft nontender, nondistended, no HSM   Extremities:   Moves all extremities well, no edema, no cyanosis, no             Redness, no rash     Medication Review:      glipizide, 5 mg, Oral, BID AC  insulin regular, 0-14 Units, Subcutaneous, Q6H  levothyroxine, 75 mcg, Oral, Daily  pantoprazole, 40 mg, Oral, QAM  pregabalin, 50 mg, Oral, Nightly  senna-docusate sodium, 2 tablet, Oral, BID  sodium chloride, 10 mL, Intravenous, Q12H      heparin, 18 Units/kg/hr, Last Rate: 15 Units/kg/hr (11/09/22 0809)          I reviewed the patient's new clinical results.  I personally viewed and interpreted the patient's EKG/Telemetry data    Assessment/Plan  Active Hospital Problems    Diagnosis  POA   • **Bilateral pulmonary embolism (HCC) [I26.99]  Yes    "  Priority: High   • Chest pain, atypical [R07.89]  Unknown   • Stage 3a chronic kidney disease (HCC) [N18.31]  Yes   • Essential hypertension [I10]  Yes   • Mixed hyperlipidemia [E78.2]  Yes   • Hypothyroidism (acquired) [E03.9]  Yes      Resolved Hospital Problems   No resolved problems to display.     Submassive pulmonary embolus treated with EKOS therapy now on heparin doing pretty well.  I think we need to transfer out of the CCU transition over to [facility].    Brian Weems MD  11/09/22  12:44 EST

## 2022-11-09 NOTE — PLAN OF CARE
Problem: Adult Inpatient Plan of Care  Goal: Plan of Care Review  Outcome: Ongoing, Progressing  Flowsheets (Taken 11/9/2022 7307)  Progress: improving  Plan of Care Reviewed With: patient  Outcome Evaluation: In CCU, transfer orders placed. Heparin drip stopped and eliquis started. OOB to bathroom. Family updated at bedside.

## 2022-11-10 PROBLEM — I82.401 RIGHT LEG DVT: Status: ACTIVE | Noted: 2022-11-10

## 2022-11-10 LAB
APTT PPP: 34.5 SECONDS (ref 22.7–35.4)
BASOPHILS # BLD AUTO: 0.03 10*3/MM3 (ref 0–0.2)
BASOPHILS NFR BLD AUTO: 0.4 % (ref 0–1.5)
DEPRECATED RDW RBC AUTO: 46.5 FL (ref 37–54)
EOSINOPHIL # BLD AUTO: 0.22 10*3/MM3 (ref 0–0.4)
EOSINOPHIL NFR BLD AUTO: 3.1 % (ref 0.3–6.2)
ERYTHROCYTE [DISTWIDTH] IN BLOOD BY AUTOMATED COUNT: 13.7 % (ref 12.3–15.4)
GLUCOSE BLDC GLUCOMTR-MCNC: 105 MG/DL (ref 70–130)
GLUCOSE BLDC GLUCOMTR-MCNC: 114 MG/DL (ref 70–130)
GLUCOSE BLDC GLUCOMTR-MCNC: 161 MG/DL (ref 70–130)
GLUCOSE BLDC GLUCOMTR-MCNC: 188 MG/DL (ref 70–130)
GLUCOSE BLDC GLUCOMTR-MCNC: 223 MG/DL (ref 70–130)
HBA1C MFR BLD: 8.7 % (ref 4.8–5.6)
HCT VFR BLD AUTO: 25.9 % (ref 34–46.6)
HGB BLD-MCNC: 8.6 G/DL (ref 12–15.9)
IMM GRANULOCYTES # BLD AUTO: 0.06 10*3/MM3 (ref 0–0.05)
IMM GRANULOCYTES NFR BLD AUTO: 0.8 % (ref 0–0.5)
LYMPHOCYTES # BLD AUTO: 2.11 10*3/MM3 (ref 0.7–3.1)
LYMPHOCYTES NFR BLD AUTO: 29.3 % (ref 19.6–45.3)
MCH RBC QN AUTO: 31.2 PG (ref 26.6–33)
MCHC RBC AUTO-ENTMCNC: 33.2 G/DL (ref 31.5–35.7)
MCV RBC AUTO: 93.8 FL (ref 79–97)
MONOCYTES # BLD AUTO: 0.46 10*3/MM3 (ref 0.1–0.9)
MONOCYTES NFR BLD AUTO: 6.4 % (ref 5–12)
NEUTROPHILS NFR BLD AUTO: 4.32 10*3/MM3 (ref 1.7–7)
NEUTROPHILS NFR BLD AUTO: 60 % (ref 42.7–76)
NRBC BLD AUTO-RTO: 0 /100 WBC (ref 0–0.2)
PLATELET # BLD AUTO: 286 10*3/MM3 (ref 140–450)
PMV BLD AUTO: 9.7 FL (ref 6–12)
RBC # BLD AUTO: 2.76 10*6/MM3 (ref 3.77–5.28)
WBC NRBC COR # BLD: 7.2 10*3/MM3 (ref 3.4–10.8)

## 2022-11-10 PROCEDURE — 85025 COMPLETE CBC W/AUTO DIFF WBC: CPT | Performed by: INTERNAL MEDICINE

## 2022-11-10 PROCEDURE — 85730 THROMBOPLASTIN TIME PARTIAL: CPT | Performed by: INTERNAL MEDICINE

## 2022-11-10 PROCEDURE — 83036 HEMOGLOBIN GLYCOSYLATED A1C: CPT | Performed by: HOSPITALIST

## 2022-11-10 PROCEDURE — 63710000001 INSULIN LISPRO (HUMAN) PER 5 UNITS: Performed by: INTERNAL MEDICINE

## 2022-11-10 PROCEDURE — 99232 SBSQ HOSP IP/OBS MODERATE 35: CPT | Performed by: INTERNAL MEDICINE

## 2022-11-10 PROCEDURE — 82962 GLUCOSE BLOOD TEST: CPT

## 2022-11-10 RX ORDER — NICOTINE POLACRILEX 4 MG
15 LOZENGE BUCCAL
Status: DISCONTINUED | OUTPATIENT
Start: 2022-11-10 | End: 2022-11-11 | Stop reason: HOSPADM

## 2022-11-10 RX ORDER — INSULIN LISPRO 100 [IU]/ML
0-7 INJECTION, SOLUTION INTRAVENOUS; SUBCUTANEOUS
Status: DISCONTINUED | OUTPATIENT
Start: 2022-11-10 | End: 2022-11-11 | Stop reason: HOSPADM

## 2022-11-10 RX ORDER — DEXTROSE MONOHYDRATE 25 G/50ML
25 INJECTION, SOLUTION INTRAVENOUS
Status: DISCONTINUED | OUTPATIENT
Start: 2022-11-10 | End: 2022-11-11 | Stop reason: HOSPADM

## 2022-11-10 RX ADMIN — DOCUSATE SODIUM 50MG AND SENNOSIDES 8.6MG 2 TABLET: 8.6; 5 TABLET, FILM COATED ORAL at 20:44

## 2022-11-10 RX ADMIN — INSULIN LISPRO 2 UNITS: 100 INJECTION, SOLUTION INTRAVENOUS; SUBCUTANEOUS at 13:12

## 2022-11-10 RX ADMIN — PREGABALIN 50 MG: 50 CAPSULE ORAL at 20:44

## 2022-11-10 RX ADMIN — PANTOPRAZOLE SODIUM 40 MG: 40 TABLET, DELAYED RELEASE ORAL at 06:48

## 2022-11-10 RX ADMIN — GLIPIZIDE 5 MG: 5 TABLET ORAL at 17:34

## 2022-11-10 RX ADMIN — APIXABAN 10 MG: 5 TABLET, FILM COATED ORAL at 08:05

## 2022-11-10 RX ADMIN — TRAMADOL HYDROCHLORIDE 50 MG: 50 TABLET, COATED ORAL at 20:44

## 2022-11-10 RX ADMIN — GLIPIZIDE 5 MG: 5 TABLET ORAL at 06:48

## 2022-11-10 RX ADMIN — LEVOTHYROXINE SODIUM 75 MCG: 0.07 TABLET ORAL at 05:36

## 2022-11-10 RX ADMIN — APIXABAN 10 MG: 5 TABLET, FILM COATED ORAL at 20:44

## 2022-11-10 RX ADMIN — INSULIN LISPRO 3 UNITS: 100 INJECTION, SOLUTION INTRAVENOUS; SUBCUTANEOUS at 17:34

## 2022-11-10 RX ADMIN — Medication 10 ML: at 08:10

## 2022-11-10 RX ADMIN — TRAMADOL HYDROCHLORIDE 50 MG: 50 TABLET, COATED ORAL at 10:30

## 2022-11-10 NOTE — PROGRESS NOTES
"  PROGRESS NOTE  Patient Name: Alejandra Varner  Age/Sex: 72 y.o. female  : 1950  MRN: [id number]    Date of Admission: 2022  Date of Encounter Visit: 11/10/22   LOS: 4 days   Patient Care Team:  Elysia Reich MD as PCP - General (Family Medicine)  Brian Weems MD as Consulting Physician (Cardiology)    Chief Complaint: DVT with PE    Hospital course: Patient is currently on room air, she is on oral anticoagulation with no problem with the bleeding or any surgical issues.  She did test negative for the factor V Leiden even though her daughter has it.  No pleurisy, no chest pain, no respiratory complaints.    REVIEW OF SYSTEMS:   CONSTITUTIONAL: no fever or chills  CARDIOVASCULAR: No chest pain, chest pressure or chest discomfort. No palpitations or edema.   RESPIRATORY: Shortness of breath, no hemoptysis or pleurisy.   GASTROINTESTINAL: No anorexia, nausea, vomiting or diarrhea. No abdominal pain or blood.   HEMATOLOGIC: No bleeding or bruising.     Ventilator/Non-Invasive Ventilation Settings (From admission, onward)    Room air            Vital Signs  Temp:  [98.1 °F (36.7 °C)-98.8 °F (37.1 °C)] 98.6 °F (37 °C)  Heart Rate:  [] 99  BP: (116-158)/(61-79) 125/69  SpO2:  [90 %-98 %] 96 %  on  Flow (L/min):  [1] 1 Device (Oxygen Therapy): room air    Intake/Output Summary (Last 24 hours) at 11/10/2022 09  Last data filed at 11/10/2022 0500  Gross per 24 hour   Intake 675 ml   Output 1550 ml   Net -875 ml     Flowsheet Rows    Flowsheet Row First Filed Value   Admission Height 167.6 cm (66\") Documented at 2022   Admission Weight 93.3 kg (205 lb 11 oz) Documented at 2022        Body mass index is 33.41 kg/m².      22  0600 22  0600 11/10/22  0500   Weight: 97 kg (213 lb 13.5 oz) (pt has ekos equipment on bed. recommend new weight on dc) 98.3 kg (216 lb 11.4 oz) 93.9 kg (207 lb 0.2 oz)       Physical Exam:  GEN:  No acute distress, alert, cooperative, well " developed   EYES:   Sclerae clear. No icterus. PERRL. Normal EOM  ENT:   External ears/nose normal, no oral lesions, no thrush, mucous membranes moist  NECK: patient has a soft neck collar on could not check for range of motion, midline trachea, no JVD  LUNGS: Normal chest on inspection, CTAB, no wheezes. No rhonchi. No crackles. Respirations regular, even and unlabored.   CV:  Regular rhythm and rate. Normal S1/S2. No murmurs, gallops, or rubs noted.  ABD:  Soft, nontender and nondistended. Normal bowel sounds. No guarding  EXT:  Moves all extremities well. No cyanosis. No redness. nonpitting bilateral edema .   Skin: Dry, intact, no bleeding    Results Review:    Results From Last 14 Days   Lab Units 11/07/22  0609 11/07/22  0024   LACTATE mmol/L 1.4 4.9*     Results from last 7 days   Lab Units 11/09/22  1023 11/08/22  0610 11/07/22  0609   SODIUM mmol/L 139 138 131*   POTASSIUM mmol/L 4.4 4.1 4.3   CHLORIDE mmol/L 105 106 96*   CO2 mmol/L 25.0 22.0 17.4*   BUN mg/dL 17 29* 37*   CREATININE mg/dL 1.19* 1.40* 2.20*   CALCIUM mg/dL 8.8 8.1* 9.0   AST (SGOT) U/L  --   --  29   ALT (SGPT) U/L  --   --  18   ANION GAP mmol/L 9.0 10.0 17.6*   ALBUMIN g/dL  --   --  3.30*     Results from last 7 days   Lab Units 11/07/22  0609   TROPONIN T ng/mL 0.069*         Results from last 7 days   Lab Units 11/07/22  0609   PROBNP pg/mL 9,955.0*     Results from last 7 days   Lab Units 11/10/22  0540 11/09/22  0458 11/08/22  0610 11/07/22  1342 11/07/22  0609 11/06/22  2249   WBC 10*3/mm3 7.20 7.54 6.15  --  11.04* 10.62   HEMOGLOBIN g/dL 8.6* 8.9* 8.6*  --  10.2* 9.9*   HEMOGLOBIN, POC g/dL  --   --   --  9.5*  --   --    HEMATOCRIT % 25.9* 25.9* 25.0*  --  31.3* 29.1*   HEMATOCRIT POC %  --   --   --  28*  --   --    PLATELETS 10*3/mm3 286 269 232  --  325 311   MCV fL 93.8 89.9 90.6  --  96.3 92.1   NEUTROPHIL % % 60.0 62.4 68.0  --  55.0 66.5   LYMPHOCYTE % % 29.3 27.1 22.4  --  35.1 24.8   MONOCYTES % % 6.4 6.2 6.8  --  7.1  7.1   EOSINOPHIL % % 3.1 3.2 1.5  --  1.7 0.6   BASOPHIL % % 0.4 0.3 0.2  --  0.5 0.3   IMM GRAN % % 0.8* 0.8* 1.1*  --  0.6* 0.7*     Results from last 7 days   Lab Units 11/10/22  0540 11/09/22  0458 11/08/22 2059 11/08/22  1037 11/08/22  0610 11/07/22  1528 11/07/22  0609 11/06/22  2249   INR   --   --   --   --  1.06  --   --  1.20*   APTT seconds 34.5 128.1* 93.8* 27.2 31.0 59.8* >200.0* 87.5*               Invalid input(s): LDLCALC          Glucose   Date/Time Value Ref Range Status   11/10/2022 0528 105 70 - 130 mg/dL Final     Comment:     Meter: [id number] : [id number] Tera [facility]   11/10/2022 0004 114 70 - 130 mg/dL Final     Comment:     Meter: [id number] : [id number] Stefania Chandler RN   11/09/2022 2333 120 70 - 130 mg/dL Final     Comment:     Meter: [id number] : [id number] Tera [facility]   11/09/2022 1609 171 (H) 70 - 130 mg/dL Final     Comment:     Meter: [id number] : [id number] Renetta Lauren NA   11/09/2022 1127 196 (H) 70 - 130 mg/dL Final     Comment:     Meter: [id number] : [id number] Renetta Lauren NA   11/09/2022 0543 128 70 - 130 mg/dL Final     Comment:     Meter: [id number] : [id number] Mary Alice Dumont JOSÉ ANTONIO   11/09/2022 0004 117 70 - 130 mg/dL Final     Comment:     Meter: [id number] : [id number] Mary Alice Dumont JOSÉ ANTONIO   11/08/2022 1724 208 (H) 70 - 130 mg/dL Final     Comment:     Meter: [id number] : [id number] Renetta Lauren NA     Results from last 7 days   Lab Units 11/07/22  0609 11/07/22  0024   LACTATE mmol/L 1.4 4.9*                           Imaging:   Imaging Results (All)     Procedure Component Value Units Date/Time    CT Outside Films [[id number]] Resulted: 11/07/22 0832     Updated: 11/07/22 0832    Narrative:      This procedure was auto-finalized with no dictation required.          I reviewed the patient's new clinical results.  I personally viewed and interpreted the patient's imaging results:        Medication Review:   apixaban, 10  mg, Oral, Q12H   Followed by  [START ON 11/16/2022] apixaban, 5 mg, Oral, Q12H  glipizide, 5 mg, Oral, BID AC  insulin regular, 0-14 Units, Subcutaneous, Q6H  levothyroxine, 75 mcg, Oral, Daily  pantoprazole, 40 mg, Oral, QAM  pregabalin, 50 mg, Oral, Nightly  senna-docusate sodium, 2 tablet, Oral, BID  sodium chloride, 10 mL, Intravenous, Q12H             ASSESSMENT:   1. Acute bilateral PE, probably provoked due to immobilization and recent surgery  2. Dyspnea secondary to above  3. Elevated troponin, secondary to cardiac strain from PE VS non-STEMI type II  4. RV strain on CT chest  5. Acute DVT of the right in the right distal femoral popliteal posterior tibial and peroneal gastrocnemius and soleal veins, positive for acute DVT in the posterior tibial and peroneal and soleal only.  6. Transient hypotension  7. Abnormal EKG: T wave inversion in lead V2 (old) in V3 ( new)  8. ENID, on Pap  9. CKD stage III  10. Family history of factor V Leiden  11. Cervical stenosis and radiculopathy, s/p anterior cervical discectomy C5-C6 and C6/C7 cervical fusion 10/26  12. DM type II  13. HTN  14. Hypothyroidism  15. Osteoarthritis    PLAN:  Doing well, and cleared to transfer out of the unit, she is cleared to be discharged home from my standpoint  She had negative factor V Leiden mutation  She needs to be on the anticoagulation for 3 to 6 months and can be discontinued afterward with the instruction to be on prophylactic anticoagulation after any future surgical procedure or risk factors like immobilization and to take extra measures during prolonged drives or prolonged flights or situation that increase the venous stasis.  She can follow-up with cardiology on that, we will follow-up with her as needed patient is no longer on oxygen.      Disposition: Home    Jessica Ibrahim MD  11/10/22  09:33 EST           Dictated utilizing Dragon dictation

## 2022-11-10 NOTE — PROGRESS NOTES
Name: Alejandra Varner ADMIT: 2022   : 1950  PCP: Elysia Reich MD    MRN: [id number] LOS: 4 days   AGE/SEX: 72 y.o. female  ROOM: [facility]     Subjective   Subjective   No complaints. She is feeling much better. She has ambulated in the room and has improved breathing less dyspnea on exertion and she can take a deeper breath. Waiting for bed out of unit. Appetite is fine ate breakfast well.    Review of Systems   Constitutional: Negative for fever.   Respiratory: Negative for cough and shortness of breath.    Cardiovascular: Positive for leg swelling.   Gastrointestinal: Negative for abdominal pain, diarrhea, nausea and vomiting.   Genitourinary: Negative for dysuria.   Neurological: Negative for headaches.      Objective   Objective   Vital Signs  Temp:  [98.1 °F (36.7 °C)-98.8 °F (37.1 °C)] 98.6 °F (37 °C)  Heart Rate:  [] 99  BP: (116-158)/(61-79) 125/69  SpO2:  [90 %-98 %] 96 %  on  Flow (L/min):  [1] 1;   Device (Oxygen Therapy): room air  Body mass index is 33.41 kg/m².    Physical Exam  Constitutional:       General: She is not in acute distress.     Appearance: Normal appearance. She is not toxic-appearing.   HENT:      Head: Normocephalic and atraumatic.   Cardiovascular:      Rate and Rhythm: Normal rate and regular rhythm.   Pulmonary:      Effort: Pulmonary effort is normal. No respiratory distress.      Breath sounds: Normal breath sounds. No wheezing, rhonchi or rales.   Abdominal:      General: Bowel sounds are normal.      Palpations: Abdomen is soft.      Tenderness: There is no abdominal tenderness. There is no guarding or rebound.   Musculoskeletal:      Right lower leg: Edema present.      Left lower leg: Edema present.      Comments: Trace lower extremity edema   Skin:     General: Skin is warm and dry.   Neurological:      General: No focal deficit present.      Mental Status: She is alert and oriented to person, place, and time.   Psychiatric:         Mood and  Affect: Mood normal.         Behavior: Behavior normal.         Thought Content: Thought content normal.     Results Review  I reviewed the patient's new clinical results.  Results from last 7 days   Lab Units 11/10/22  0540 11/09/22  0458 11/08/22  0610 11/07/22  1342 11/07/22  0609   WBC 10*3/mm3 7.20 7.54 6.15  --  11.04*   HEMOGLOBIN g/dL 8.6* 8.9* 8.6*  --  10.2*   HEMOGLOBIN, POC g/dL  --   --   --  9.5*  --    PLATELETS 10*3/mm3 286 269 232  --  325     Results from last 7 days   Lab Units 11/09/22  1023 11/08/22  0610 11/07/22  0609   SODIUM mmol/L 139 138 131*   POTASSIUM mmol/L 4.4 4.1 4.3   CHLORIDE mmol/L 105 106 96*   CO2 mmol/L 25.0 22.0 17.4*   BUN mg/dL 17 29* 37*   CREATININE mg/dL 1.19* 1.40* 2.20*   GLUCOSE mg/dL 216* 202* 106*     Lab Results   Component Value Date    ANIONGAP 9.0 11/09/2022     Estimated Creatinine Clearance: 48.6 mL/min (A) (by C-G formula based on SCr of 1.19 mg/dL (H)).    Results from last 7 days   Lab Units 11/07/22  0609   ALBUMIN g/dL 3.30*   BILIRUBIN mg/dL 0.5   ALK PHOS U/L 100   AST (SGOT) U/L 29   ALT (SGPT) U/L 18     Results from last 7 days   Lab Units 11/09/22  1023 11/08/22  0610 11/07/22  0609   CALCIUM mg/dL 8.8 8.1* 9.0   ALBUMIN g/dL  --   --  3.30*     Results from last 7 days   Lab Units 11/07/22  0609 11/07/22  0024   LACTATE mmol/L 1.4 4.9*     Glucose   Date/Time Value Ref Range Status   11/10/2022 0528 105 70 - 130 mg/dL Final     Comment:     Meter: [id number] : [id number] Tera [facility]   11/10/2022 0004 114 70 - 130 mg/dL Final     Comment:     Meter: [id number] : [id number] Stefania Chandler RN   11/09/2022 2333 120 70 - 130 mg/dL Final     Comment:     Meter: [id number] : [id number] [facility]   11/09/2022 1609 171 (H) 70 - 130 mg/dL Final     Comment:     Meter: [id number] : [id number] Renetta HI   11/09/2022 1127 196 (H) 70 - 130 mg/dL Final     Comment:     Meter: [id number] : [id number] Renetta HI    11/09/2022 0543 128 70 - 130 mg/dL Final     Comment:     Meter: [id number] : [id number] Mary Alice Dumont JOSÉ ANTONIO   11/09/2022 0004 117 70 - 130 mg/dL Final     Comment:     Meter: [id number] : [id number] Mary Alice CHOU       No radiology results for the last day    Scheduled Meds  apixaban, 10 mg, Oral, Q12H   Followed by  [START ON 11/16/2022] apixaban, 5 mg, Oral, Q12H  glipizide, 5 mg, Oral, BID AC  insulin regular, 0-14 Units, Subcutaneous, Q6H  levothyroxine, 75 mcg, Oral, Daily  pantoprazole, 40 mg, Oral, QAM  pregabalin, 50 mg, Oral, Nightly  senna-docusate sodium, 2 tablet, Oral, BID  sodium chloride, 10 mL, Intravenous, Q12H    Continuous Infusions   PRN Meds  •  acetaminophen  •  senna-docusate sodium **AND** polyethylene glycol **AND** bisacodyl **AND** bisacodyl  •  cyclobenzaprine  •  dextrose  •  dextrose  •  glucagon (human recombinant)  •  HYDROcodone-acetaminophen  •  ondansetron **OR** ondansetron  •  sodium chloride  •  traMADol     Diet  Diet Regular    I have personally reviewed:  [x]  Laboratory   []  Microbiology   [x]  Radiology   [x]  EKG/Telemetry   []  Cardiology/Vascular   []  Pathology   []  Records     Assessment/Plan     Active Hospital Problems    Diagnosis  POA   • **Bilateral pulmonary embolism (HCC) [I26.99]  Yes   • Right leg DVT (HCC) [I82.401]  Unknown   • Stage 3a chronic kidney disease (HCC) [N18.31]  Yes   • Essential hypertension [I10]  Yes   • Mixed hyperlipidemia [E78.2]  Yes   • Hypothyroidism (acquired) [E03.9]  Yes      Resolved Hospital Problems   No resolved problems to display.     72 y.o. female with a history of anterior cervical discectomy fusion instrumentation 10/26/2022, hypertension, hyperlipidemia, CKD transferred from [facility] where she was found to have extensive bilateral PE and right heart strain. Presented there with shortness of breath dyspnea with minimal exertion    Bilateral PE, right DVT  -Felt to be provoked by immobility, recent  surgery. Family history of factor V Leiden   -Status post EKOS, now on apixaban  -Moving out of CCU    Recent C-spine surgery  -Neurosurgery plans follow-up in 4 weeks     DM2  -Controlled  -Switch Accu-Cheks from every 6 hours to mealtime  -Also on glipizide  -Monitor for hypoglycemia  -Will check A1c    CKD 3, Hypertension, hypothyroidism  -BP okay off home medications of Inderal and chlorthalidone    Discussed with patient, family and nursing staff    Discharge: STANISLAW Durán MD  [facility]  11/10/22

## 2022-11-10 NOTE — PLAN OF CARE
Goal Outcome Evaluation:  Plan of Care Reviewed With: patient, spouse        Progress: improving     Pt remain in CCU, A&O x4. O2 sat drops down to 87 when pt is sleeping, was put on 1L NC and pt was fine. VSS.

## 2022-11-10 NOTE — PROGRESS NOTES
"Alejandra Varner  1950 72 y.o.  [id number]      Patient Care Team:  Elysia Reich MD as PCP - General (Family Medicine)  Brian Weems MD as Consulting Physician (Cardiology)    CC: Cervical spine surgery 12 days ago, shortness of breath began 4 days ago but severe shortness of breath 2 days ago, submassive pulmonary embolus, s/p EKOS    Interval History: No significant change still a little bit tachycardic      Objective   Vital Signs  Temp:  [98.1 °F (36.7 °C)-98.8 °F (37.1 °C)] 98.6 °F (37 °C)  Heart Rate:  [] 99  BP: (116-158)/(61-79) 125/69    Intake/Output Summary (Last 24 hours) at 11/10/2022 0944  Last data filed at 11/10/2022 0500  Gross per 24 hour   Intake 675 ml   Output 1550 ml   Net -875 ml     Flowsheet Rows    Flowsheet Row First Filed Value   Admission Height 167.6 cm (66\") Documented at 11/06/2022 1928   Admission Weight 93.3 kg (205 lb 11 oz) Documented at 11/06/2022 1928          Physical Exam:   General Appearance:    Alert,oriented, in no acute distress   Lungs:     Clear to auscultation,BS are equal    Heart:    Normal S1 and S2, RRR without murmur, gallop or rub   HEENT:    Sclerae are clear, no JVD or adenopathy   Abdomen:     Normal bowel sounds, soft nontender, nondistended, no HSM   Extremities:   Moves all extremities well, no edema, no cyanosis, no             Redness, no rash     Medication Review:      apixaban, 10 mg, Oral, Q12H   Followed by  [START ON 11/16/2022] apixaban, 5 mg, Oral, Q12H  glipizide, 5 mg, Oral, BID AC  insulin lispro, 0-7 Units, Subcutaneous, 4x Daily With Meals & Nightly  levothyroxine, 75 mcg, Oral, Daily  pantoprazole, 40 mg, Oral, QAM  pregabalin, 50 mg, Oral, Nightly  senna-docusate sodium, 2 tablet, Oral, BID  sodium chloride, 10 mL, Intravenous, Q12H             I reviewed the patient's new clinical results.  I personally viewed and interpreted the patient's EKG/Telemetry data    Assessment/Plan  Active Hospital Problems    Diagnosis  " POA   • **Bilateral pulmonary embolism (HCC) [I26.99]  Yes     Priority: High   • Right leg DVT (HCC) [I82.401]  Unknown   • Stage 3a chronic kidney disease (HCC) [N18.31]  Yes   • Essential hypertension [I10]  Yes   • Mixed hyperlipidemia [E78.2]  Yes   • Hypothyroidism (acquired) [E03.9]  Yes      Resolved Hospital Problems   No resolved problems to display.     Submassive pulmonary embolus treated with EKOS still a little bit tacky.  I like her to move out of the CCU to get up and start moving around see how she does continue her Karenquis    Brian Weems MD  11/10/22  09:44 EST

## 2022-11-10 NOTE — SIGNIFICANT NOTE
11/10/22 1524   OTHER   Discipline physical therapist   Rehab Time/Intention   Session Not Performed other (see comments)  (Patient up adlib per nsg. Acute PT to sign off.)   Therapy Assessment/Plan (PT)   Criteria for Skilled Interventions Met (PT) no;no problems identified which require skilled intervention

## 2022-11-11 ENCOUNTER — READMISSION MANAGEMENT (OUTPATIENT)
Dept: CALL CENTER | Facility: HOSPITAL | Age: 72
End: 2022-11-11

## 2022-11-11 VITALS
BODY MASS INDEX: 32.78 KG/M2 | SYSTOLIC BLOOD PRESSURE: 135 MMHG | DIASTOLIC BLOOD PRESSURE: 74 MMHG | OXYGEN SATURATION: 95 % | TEMPERATURE: 98.3 F | HEART RATE: 103 BPM | HEIGHT: 66 IN | RESPIRATION RATE: 18 BRPM | WEIGHT: 204 LBS

## 2022-11-11 LAB
APTT PPP: 30.5 SECONDS (ref 22.7–35.4)
BASOPHILS # BLD AUTO: 0.03 10*3/MM3 (ref 0–0.2)
BASOPHILS NFR BLD AUTO: 0.4 % (ref 0–1.5)
DEPRECATED RDW RBC AUTO: 45.4 FL (ref 37–54)
EOSINOPHIL # BLD AUTO: 0.21 10*3/MM3 (ref 0–0.4)
EOSINOPHIL NFR BLD AUTO: 3 % (ref 0.3–6.2)
ERYTHROCYTE [DISTWIDTH] IN BLOOD BY AUTOMATED COUNT: 13.4 % (ref 12.3–15.4)
GLUCOSE BLDC GLUCOMTR-MCNC: 113 MG/DL (ref 70–130)
GLUCOSE BLDC GLUCOMTR-MCNC: 241 MG/DL (ref 70–130)
HCT VFR BLD AUTO: 29.4 % (ref 34–46.6)
HGB BLD-MCNC: 10 G/DL (ref 12–15.9)
IMM GRANULOCYTES # BLD AUTO: 0.07 10*3/MM3 (ref 0–0.05)
IMM GRANULOCYTES NFR BLD AUTO: 1 % (ref 0–0.5)
LYMPHOCYTES # BLD AUTO: 2.17 10*3/MM3 (ref 0.7–3.1)
LYMPHOCYTES NFR BLD AUTO: 31.5 % (ref 19.6–45.3)
MCH RBC QN AUTO: 31.8 PG (ref 26.6–33)
MCHC RBC AUTO-ENTMCNC: 34 G/DL (ref 31.5–35.7)
MCV RBC AUTO: 93.6 FL (ref 79–97)
MONOCYTES # BLD AUTO: 0.45 10*3/MM3 (ref 0.1–0.9)
MONOCYTES NFR BLD AUTO: 6.5 % (ref 5–12)
NEUTROPHILS NFR BLD AUTO: 3.96 10*3/MM3 (ref 1.7–7)
NEUTROPHILS NFR BLD AUTO: 57.6 % (ref 42.7–76)
NRBC BLD AUTO-RTO: 0 /100 WBC (ref 0–0.2)
PLATELET # BLD AUTO: 347 10*3/MM3 (ref 140–450)
PMV BLD AUTO: 9.7 FL (ref 6–12)
RBC # BLD AUTO: 3.14 10*6/MM3 (ref 3.77–5.28)
WBC NRBC COR # BLD: 6.89 10*3/MM3 (ref 3.4–10.8)

## 2022-11-11 PROCEDURE — 99232 SBSQ HOSP IP/OBS MODERATE 35: CPT | Performed by: NURSE PRACTITIONER

## 2022-11-11 PROCEDURE — 85730 THROMBOPLASTIN TIME PARTIAL: CPT | Performed by: INTERNAL MEDICINE

## 2022-11-11 PROCEDURE — 85025 COMPLETE CBC W/AUTO DIFF WBC: CPT | Performed by: INTERNAL MEDICINE

## 2022-11-11 PROCEDURE — 82962 GLUCOSE BLOOD TEST: CPT

## 2022-11-11 PROCEDURE — 63710000001 INSULIN LISPRO (HUMAN) PER 5 UNITS: Performed by: INTERNAL MEDICINE

## 2022-11-11 RX ADMIN — Medication 10 ML: at 08:21

## 2022-11-11 RX ADMIN — LEVOTHYROXINE SODIUM 75 MCG: 0.07 TABLET ORAL at 06:29

## 2022-11-11 RX ADMIN — PANTOPRAZOLE SODIUM 40 MG: 40 TABLET, DELAYED RELEASE ORAL at 06:29

## 2022-11-11 RX ADMIN — TRAMADOL HYDROCHLORIDE 50 MG: 50 TABLET, COATED ORAL at 08:25

## 2022-11-11 RX ADMIN — APIXABAN 10 MG: 5 TABLET, FILM COATED ORAL at 08:20

## 2022-11-11 RX ADMIN — INSULIN LISPRO 3 UNITS: 100 INJECTION, SOLUTION INTRAVENOUS; SUBCUTANEOUS at 11:46

## 2022-11-11 RX ADMIN — GLIPIZIDE 5 MG: 5 TABLET ORAL at 08:20

## 2022-11-11 NOTE — PLAN OF CARE
Goal Outcome Evaluation:  Plan of Care Reviewed With: patient           Outcome Evaluation: VSS on room air this shift, pt remained sinus tach in 100-110 range. Pt discharged home with . Dischare paperwork reviewed with pt; pt had no further questions. IV removed.

## 2022-11-11 NOTE — CASE MANAGEMENT/SOCIAL WORK
Case Management Discharge Note      Final Note: home no needs    Provided Post Acute Provider List?: N/A  Provided Post Acute Provider Quality & Resource List?: N/A    Selected Continued Care - Admitted Since 11/6/2022     Destination    No services have been selected for the patient.              Durable Medical Equipment    No services have been selected for the patient.              Dialysis/Infusion    No services have been selected for the patient.              Home Medical Care    No services have been selected for the patient.              Therapy    No services have been selected for the patient.              Community Resources    No services have been selected for the patient.              Community & DME    No services have been selected for the patient.                  Transportation Services  Private: Car    Final Discharge Disposition Code: 01 - home or self-care

## 2022-11-11 NOTE — PROGRESS NOTES
LOS: 5 days   Patient Care Team:  Elysia Reich MD as PCP - General (Family Medicine)  Brian Weems MD as Consulting Physician (Cardiology)      Chief Complaint: Follow-up PE       Interval History: No complaints today other than lower extremity swelling.      Objective   Vital Signs  Temp:  [98.2 °F (36.8 °C)-98.8 °F (37.1 °C)] 98.2 °F (36.8 °C)  Heart Rate:  [] 95  Resp:  [18] 18  BP: (115-150)/(55-81) 134/74    Intake/Output Summary (Last 24 hours) at 11/11/2022 0803  Last data filed at 11/10/2022 2000  Gross per 24 hour   Intake 360 ml   Output --   Net 360 ml         Physical Exam:  Constitutional: Well appearing, well developed, no acute distress   HENT: Oropharynx clear and membrane moist  Eyes: Normal conjunctiva, no sclera icterus.  Neck: Supple, no carotid bruit bilaterally.  Cardiovascular: Regular rate and rhythm, No Murmur, 1+ bilateral lower extremity edema.  Pulmonary: Normal respiratory effort, normal lung sounds, no wheezing.  Abdominal: Soft, nontender, no hepatosplenomegaly, liver is non-pulsatile.  Neurological: Alert and orient x 3.   Skin: Warm, dry, no ecchymosis, no rash.  Right groin clean, dry, and intact with no erythema or edema.  Psych: Appropriate mood and affect. Normal judgment and insight.      Results Review:      Results from last 7 days   Lab Units 11/09/22  1023 11/08/22  0610 11/07/22  0609   SODIUM mmol/L 139 138 131*   POTASSIUM mmol/L 4.4 4.1 4.3   CHLORIDE mmol/L 105 106 96*   CO2 mmol/L 25.0 22.0 17.4*   BUN mg/dL 17 29* 37*   CREATININE mg/dL 1.19* 1.40* 2.20*   GLUCOSE mg/dL 216* 202* 106*   CALCIUM mg/dL 8.8 8.1* 9.0     Results from last 7 days   Lab Units 11/07/22  0609   TROPONIN T ng/mL 0.069*     Results from last 7 days   Lab Units 11/10/22  0540 11/09/22  0458 11/08/22  0610   WBC 10*3/mm3 7.20 7.54 6.15   HEMOGLOBIN g/dL 8.6* 8.9* 8.6*   HEMATOCRIT % 25.9* 25.9* 25.0*   PLATELETS 10*3/mm3 286 269 232     Results from last 7 days   Lab Units  11/10/22  0540 11/09/22  0458 11/08/22 2059 11/08/22  1037 11/08/22  0610 11/07/22  0609 11/06/22  2249   INR   --   --   --   --  1.06  --  1.20*   APTT seconds 34.5 128.1* 93.8*   < > 31.0   < > 87.5*    < > = values in this interval not displayed.                   I reviewed the patient's new clinical results.  I personally viewed and interpreted the patient's EKG/Telemetry data        Medication Review:   apixaban, 10 mg, Oral, Q12H   Followed by  [START ON 11/16/2022] apixaban, 5 mg, Oral, Q12H  glipizide, 5 mg, Oral, BID AC  insulin lispro, 0-7 Units, Subcutaneous, 4x Daily With Meals & Nightly  levothyroxine, 75 mcg, Oral, Daily  pantoprazole, 40 mg, Oral, QAM  pregabalin, 50 mg, Oral, Nightly  senna-docusate sodium, 2 tablet, Oral, BID  sodium chloride, 10 mL, Intravenous, Q12H             Assessment & Plan       Bilateral pulmonary embolism (HCC)    Stage 3a chronic kidney disease (HCC)    Essential hypertension    Mixed hyperlipidemia    Hypothyroidism (acquired)    Right leg DVT (HCC)        1.  Bilateral pulmonary embolism.  Provoked by recent surgery.  Echocardiogram demonstrated a moderate to severely dilated RV and severely reduced RV function.  Status post EKOS.  Continue Eliquis. Repeat echocardiogram in 1 month.    2.  Bilateral DVT.  Continue Eliquis.  3.  Recent C-spine surgery.  Per neurosurgery.    -From a cardiac standpoint, she is stable for discharge.  She will follow up with Dr. Weems in 1 month.      Hawa Adrian, KERWIN  11/11/22  08:03 EST

## 2022-11-11 NOTE — DISCHARGE SUMMARY
2/18/2022        RE: Jamia Coley  [address] [facility] [address]        [facility]    Chief Complaint   Patient presents with     RECHECK     HPI: Jamia Coley is a 88 year old (2/28/1933), who is being seen today for an episodic care visit at: [facility] ([facility]) [[id number]].     Brief Summary of Hospital Course: Jamia Coley lives in assisted living facility at baseline and has a past medical history of CHF, afib, CAD, hypertension, dementia, depression and anxiety, glaucoma, macular degeneration, hypothyroidism, obesity, WATSON. S/he was admitted to the hospital with increased confusion, changes in speech and L facial droop and found to have new CVA in L Lateral clark and R occipital lobe. She was also found to have 70-80% occlusion of proximal R ICA and 50% occlusion of proximal L ICA. The hospital stay was complicated with new finding of dysphagia and unable to tolerate oral intake so Gtube was placed.  She also developed thrush, and a rash of unknown origin.    Follow up needed:  -Neurology, [facility], Dr. Hartley, per routine  -consider Vascular follow up if failing medical management     Recent changes:  -1/19 - discontinue scheduled miralax, clarify nystatin S&S  -1/24 - discontinue benadryl, meclizine, prn quetiapine, vit B12 and decrease cetirizine to prn   -1/27 - MOCA 1/30, dependent for all ADLs and transfers  -1/28/2022 - add gabapentin and quetiapine for pain, anxiety, agitation control   2/1 MD visit  2/3 better anxiety today - orders: Change Seroquel to 6.25 mg p.o. every 6 hours as needed x14 days .   Check CBC, BMP, BNP tomorrow diagnosis edema  2/4 increase in edema. Lasix increased to 40 mg and potassium increased to 20 meq.   2/7 discontinue cetrizine due to no use, increase sertraline to 100 mg daily, TF decreased. Amlodipine stopped.  2/17 had GT replaced with IR for clogged and misplaced tube.    Chief Concerns Today: nurse      Patient Name: Alejandra Varner  : 1950  MRN: [id number]    Date of Admission: 2022  Date of Discharge:  2022  Primary Care Physician: Elysia Reich MD      Chief Complaint:   No chief complaint on file.      Discharge Diagnoses     Active Hospital Problems    Diagnosis  POA   • **Bilateral pulmonary embolism (HCC) [I26.99]  Yes   • Right leg DVT (HCC) [I82.401]  Unknown   • Stage 3a chronic kidney disease (HCC) [N18.31]  Yes   • Essential hypertension [I10]  Yes   • Mixed hyperlipidemia [E78.2]  Yes   • Hypothyroidism (acquired) [E03.9]  Yes      Resolved Hospital Problems   No resolved problems to display.        Hospital Course     Ms. Varner is a 72 y.o. female with a history of CKD 3A, hypertension, hyperlipidemia presenting with dyspnea found to have bilateral pulmonary embolism status post EKOS and right leg DVT.  Thought this was to be provoked due to decreased mobility after recent C-spine surgery.  Tolerate apixaban well.  Patient is without chest pain or dyspnea moving around without any assistance.  She is safe for discharge home today.    Continues to have right greater than left leg swelling secondary to her DVT.  This should go away with time.      At the time of discharge patient was told to take all medications as prescribed, keep all follow-up appointments, and call their doctor or return to the hospital with any worsening or concerning symptoms.    Day of Discharge     Subjective:  No acute events overnight.  Patient without any dyspnea or chest pain.  Patient excited to go home today.    Review of Systems   Constitutional: Negative for chills and fever.   Respiratory: Negative for cough and shortness of breath.    Cardiovascular: Positive for leg swelling. Negative for chest pain.   Gastrointestinal: Negative for abdominal pain, diarrhea and nausea.   Skin: Negative for wound.       Physical Exam:  Temp:  [98.2 °F (36.8 °C)-98.8 °F (37.1 °C)] 98.2 °F (36.8 °C)  Heart  "reports blood in g tube and black stool.     ALLERGIES: Penicillins, Donepezil, Galantamine, and Rivastigmine  Past Medical, Surgical, Family and Social History reviewed and updated in EPIC.  Medication list and progress notes reviewed in nursing home electronic health record    ROS:  Unobtainable secondary to cognitive impairment.     Vitals:  BP (!) 171/86   Pulse 86   Temp 98.2  F (36.8  C)   Resp 20   Ht 1.676 m (5' 6\")   Wt 73.8 kg (162 lb 11.2 oz)   SpO2 99%   BMI 26.26 kg/m    Exam:  GENERAL APPEARANCE:  Alert, anxious sitting in her wheelchair  CHEST/RESP:  respiratory effort normal, no respiratory distress, lung sounds diminished throughout. Pitting edema moderate of right upper extrem and 2+ pitting of bilateral lower extrem.   CV:  Rate reg, rhythm reg, no murmur,  M/S:   extremities abnormal-R UE weak, gait abnormal-unable to ambulate,  NEURO: Right facial droop.   PSYCH:  at baseline mentation is confused/disoriented.     Assessment/Plan:  Gastrointestinal hemorrhage, unspecified gastrointestinal hemorrhage type  Dysphagia, unspecified type  Feeding by G-tube  Ongoing and chronic. Diet pureed, nectar thick liquids. Patient had g tube replaced yesterday now today had dark red blood with clots out through the tube. The nurse pulled back on the syringe to check for residual and started to get bright red blood about 20 ml which she replaced.   The plan was to hold the apixiban and check a hgb on Monday.   Then a bit later the patient had a black stool. And the decision was made to send her in to be evaluated.   Most recent hgb was ~ 13 on 1/28.    Anxiety   Dementia with behavioral disturbance, unspecified type   Patient with increased anxiety in the setting of dementia and she cannot tell us what is wrong. Sertraline was increased and continued on gabapentin. Also started on scheduled seroquel 12.5 mg po BID. Anxiety continues to be unstable. Continues to be inconsolable at times.   - no changes. " Rate:  [] 95  Resp:  [18] 18  BP: (115-150)/(55-81) 134/74  Body mass index is 32.93 kg/m².  Physical Exam  Constitutional:       General: She is not in acute distress.     Appearance: She is not diaphoretic.   HENT:      Head: Normocephalic and atraumatic.      Mouth/Throat:      Mouth: Mucous membranes are moist.      Pharynx: Oropharynx is clear.   Eyes:      Extraocular Movements: Extraocular movements intact.      Pupils: Pupils are equal, round, and reactive to light.   Cardiovascular:      Rate and Rhythm: Normal rate and regular rhythm.      Heart sounds: No murmur heard.    No gallop.   Pulmonary:      Effort: Pulmonary effort is normal. No respiratory distress.      Breath sounds: No wheezing.   Abdominal:      General: Abdomen is flat. Bowel sounds are normal. There is no distension.      Palpations: Abdomen is soft.      Tenderness: There is no abdominal tenderness.   Musculoskeletal:         General: No swelling or deformity. Normal range of motion.      Right lower leg: Edema present.   Skin:     General: Skin is warm and dry.   Neurological:      General: No focal deficit present.      Mental Status: She is alert and oriented to person, place, and time.         Consultants     Consult Orders (all) (From admission, onward)     Start     Ordered    11/06/22 2221  Inpatient Pulmonology Consult  STAT        Specialty:  Pulmonary Disease  Provider:  Fabian Heard MD    11/06/22 2221 11/06/22 2157  Inpatient Cardiology Consult  Once,   Status:  Canceled        Comments: Spoke with Jessica   Specialty:  Cardiology  Provider:  Brian Weems MD    11/07/22 0002    11/06/22 2157  Inpatient Cardiology Consult  Once        Comments: Spoke with Daniel   Specialty:  Cardiology  Provider:  Brian Weems MD    11/07/22 0003    11/06/22 2144  Inpatient Cardiology Consult  Once,   Status:  Canceled        Specialty:  Cardiology  Provider:  Brian Weems MD    11/06/22 2143 11/06/22 2100  Inpatient Case  Continue supportive care at the TCU.   -  following for discharge planning     History of CVA (cerebrovascular accident)  Right hemiparesis  Recovering in a TCU. Reviewed goals of care and expected recovery briefly with dtr.    CHF  Continues on lasix. Hasn't helped really at all. Amlodipine stopped. B/ps mostly controlled except for today's reading. With inability to use tube for feeding and fluids will hold off on increasing diuretics.  - monitor closely and follow up later this week.   Wt Readings from Last 4 Encounters:   02/18/22 73.8 kg (162 lb 11.2 oz)   02/16/22 74.7 kg (164 lb 11.2 oz)   02/09/22 73.9 kg (163 lb)   02/07/22 70.9 kg (156 lb 6.4 oz)   weight is down a little bit today, continues to have the edema. In light of bleeding will not make changes today.     Total time spent with patient visit at the skilled nursing facility was 35 min including patient visit and review of past records. Greater than 50% of total time spent with counseling and coordinating care due to tenuous situation with patient at high risk for complications due to GI bleeding, G tube and hx of CVA, need for coordination of care and care planning in skilled nursing facility as well as discussion of goals of care and expected course,    Orders:    Hold apixiban x 3 days    Check hgb on Monday    Update IR on bleeding.     Send to ER for evaluation of bleeding.     Electronically signed by: Susie Bay NP         Sincerely,        Susie Bay NP     Management  Consult  Once        Provider:  (Not yet assigned)    11/07/22 0152              Procedures     Imaging Results (All)     Procedure Component Value Units Date/Time    CT Outside Films [[id number]] Resulted: 11/07/22 0832     Updated: 11/07/22 0832    Narrative:      This procedure was auto-finalized with no dictation required.          Pertinent Labs     Results from last 7 days   Lab Units 11/11/22  0726 11/10/22  0540 11/09/22  0458 11/08/22  0610   WBC 10*3/mm3 6.89 7.20 7.54 6.15   HEMOGLOBIN g/dL 10.0* 8.6* 8.9* 8.6*   PLATELETS 10*3/mm3 347 286 269 232     Results from last 7 days   Lab Units 11/09/22  1023 11/08/22  0610 11/07/22  0609   SODIUM mmol/L 139 138 131*   POTASSIUM mmol/L 4.4 4.1 4.3   CHLORIDE mmol/L 105 106 96*   CO2 mmol/L 25.0 22.0 17.4*   BUN mg/dL 17 29* 37*   CREATININE mg/dL 1.19* 1.40* 2.20*   GLUCOSE mg/dL 216* 202* 106*   Estimated Creatinine Clearance: 48.3 mL/min (A) (by C-G formula based on SCr of 1.19 mg/dL (H)).  Results from last 7 days   Lab Units 11/07/22  0609   ALBUMIN g/dL 3.30*   BILIRUBIN mg/dL 0.5   ALK PHOS U/L 100   AST (SGOT) U/L 29   ALT (SGPT) U/L 18     Results from last 7 days   Lab Units 11/09/22  1023 11/08/22  0610 11/07/22  0609   CALCIUM mg/dL 8.8 8.1* 9.0   ALBUMIN g/dL  --   --  3.30*       Results from last 7 days   Lab Units 11/07/22  0609   TROPONIN T ng/mL 0.069*   PROBNP pg/mL 9,955.0*           Invalid input(s): LDLCALC        Test Results Pending at Discharge       Discharge Details        Discharge Medications      New Medications      Instructions Start Date   apixaban 5 MG tablet tablet  Commonly known as: ELIQUIS   10 mg, Oral, Every 12 Hours Scheduled      apixaban 5 MG tablet tablet  Commonly known as: ELIQUIS   5 mg, Oral, Every 12 Hours Scheduled   Start Date: November 16, 2022        Continue These Medications      Instructions Start Date   allopurinol 100 MG tablet  Commonly known as: ZYLOPRIM   100 mg, Oral,  Nightly      chlorthalidone 25 MG tablet  Commonly known as: HYGROTON   25 mg, Oral, Daily      cyclobenzaprine 5 MG tablet  Commonly known as: FLEXERIL   5 mg, Oral, Nightly PRN      glipizide 5 MG tablet  Commonly known as: GLUCOTROL   5 mg, Oral, 2 Times Daily Before Meals      lansoprazole 30 MG capsule  Commonly known as: PREVACID   30 mg, Oral, Daily      levothyroxine 75 MCG tablet  Commonly known as: SYNTHROID, LEVOTHROID   75 mcg, Oral, Daily      Mounjaro 2.5 MG/0.5ML solution pen-injector  Generic drug: Tirzepatide   Subcutaneous, Weekly, WEDNESDAYS      pregabalin 50 MG capsule  Commonly known as: LYRICA   50 mg, Oral, Nightly      propranolol 40 MG tablet  Commonly known as: INDERAL   40 mg, Oral, 2 Times Daily      simvastatin 40 MG tablet  Commonly known as: ZOCOR   40 mg, Oral, Nightly      traMADol 50 MG tablet  Commonly known as: ULTRAM   50 mg, Oral, 2 Times Daily PRN      Vitamin D 50 MCG (2000 UT) tablet   2,000 Units, Oral, Nightly         Stop These Medications    Magnesium Oxide 400 (240 Mg) MG tablet            No Known Allergies      Discharge Disposition:  Home or Self Care    Discharge Diet:  Diet Order   Procedures   • Diet Regular; Consistent Carbohydrate       Discharge Activity:   As tolerated    CODE STATUS:    Code Status and Medical Interventions:   Ordered at: 11/06/22 2152     Code Status (Patient has no pulse and is not breathing):    CPR (Attempt to Resuscitate)     Medical Interventions (Patient has pulse or is breathing):    Full Support       Future Appointments   Date Time Provider Department Center   12/6/2022  3:30 PM Raciel German MD [facility]   12/9/2022  3:00 PM Brian Weems MD [facility]      Follow-up Information     Elysia Reich MD Follow up.    Specialty: Family Medicine  Contact information:  [address] [facility] [address] [phone]             Cardiology to call with appointment Follow up.           Brian Weems MD  Follow up.    Specialty: Cardiology  Contact information:  [address]  [facility] [address]  [place] [address]  [phone]                         Time Spent on Discharge:  Greater than 30 minutes      Josef Arndt MD  [facility]  11/11/22  12:11 EST

## 2022-11-11 NOTE — PLAN OF CARE
Problem: Adult Inpatient Plan of Care  Goal: Plan of Care Review  Outcome: Ongoing, Progressing  Goal: Patient-Specific Goal (Individualized)  Outcome: Ongoing, Progressing  Goal: Absence of Hospital-Acquired Illness or Injury  Outcome: Ongoing, Progressing  Goal: Optimal Comfort and Wellbeing  Outcome: Ongoing, Progressing  Goal: Readiness for Transition of Care  Outcome: Ongoing, Progressing     Problem: Fall Injury Risk  Goal: Absence of Fall and Fall-Related Injury  Outcome: Ongoing, Progressing     Problem: Skin Injury Risk Increased  Goal: Skin Health and Integrity  Outcome: Ongoing, Progressing     Problem: Diabetes Comorbidity  Goal: Blood Glucose Level Within Targeted Range  Outcome: Ongoing, Progressing     Problem: Hypertension Comorbidity  Goal: Blood Pressure in Desired Range  Outcome: Ongoing, Progressing     Problem: Obstructive Sleep Apnea Risk or Actual Comorbidity Management  Goal: Unobstructed Breathing During Sleep  Outcome: Ongoing, Progressing     Problem: Pain Chronic (Persistent) (Comorbidity Management)  Goal: Acceptable Pain Control and Functional Ability  Outcome: Ongoing, Progressing     Problem: VTE (Venous Thromboembolism)  Goal: VTE (Venous Thromboembolism) Symptom Resolution  Outcome: Ongoing, Progressing   Goal Outcome Evaluation:

## 2022-11-11 NOTE — CASE MANAGEMENT/SOCIAL WORK
Continued Stay Note  [facility]     Patient Name: Alejandra Varner  MRN: [id number]  Today's Date: 11/11/2022    Admit Date: 11/6/2022    Plan: Home with family   Discharge Plan     Row Name 11/11/22 1242       Plan    Plan Home with family    Patient/Family in Agreement with Plan yes    Plan Comments Spoke to pt at bedside, she denies d/c needs, and has a ride. -Wendy ESTRADA    Final Discharge Disposition Code 01 - home or self-care    Final Note home no needs               Discharge Codes    No documentation.               Expected Discharge Date and Time     Expected Discharge Date Expected Discharge Time    Nov 11, 2022             Wendy Beard RN

## 2022-11-12 NOTE — PROGRESS NOTES
"Enter Query Response Below      Query Response:       NSTEMI type 2 due to cardiac strain from PE       If applicable, please update the problem list.           Patient: Alejandra Varner        : 1950  Account: [id number]           Admit Date: 2022        How to Respond to this query:       a. Click New Note     b. Answer query within the yellow box.                c. Update the Problem List, if applicable.      If you have any questions about this query contact me at: [email or web address]    ,    Patient: Alejandra Varner        : 1950  Account: [id number]           Admit Date: 2022        How to Respond to this query:       a. Click New Note     b. Answer query within the yellow box.                c. Update the Problem List, if applicable.      If you have any questions about this query contact me at: [email or web address]    ,    Patient is a 72 year old female admitted on 22 with dyspnea found to have bilateral pulmonary embolism status post EKOS and right leg DVT. Patient with documented elevated troponin and seen by cardiology. Cardiology consult documents, \"Elevated troponin due to pulmonary embolism.\" Pulmonary consult documents, \"Elevated troponin, secondary to cardiac strain from PE VS non-STEMI type II. She was also noted to have elevated troponin at 0.4-0.5 on multiple occasions and therefore she was transferred over here for higher level of  care due to suspected non-STEMI.\" Troponin 0.069. Cardiology progress notes on  documents, \"She has a sub massive pulmonary emboli that interestingly both PEs are not in the main pulmonary artery they are more segmental.  She has a markedly enlarged RV and elevated troponin and BNP consistent with a high risk pulmonary embolus.\" Treated with EKOS therapy, IV heparin then transitioned to eliquis.    Can this be further clarified as:    NSTEMI type 2 due to cardiac strain from PE  Elevated troponin only  Other, " please specify_________  Unable to determine    By submitting this query, we are merely seeking further clarification of documentation to accurately reflect all conditions that you are monitoring, evaluating, treating or that extend the hospitalization or utilize additional resources of care. Please utilize your independent clinical judgment when addressing the question(s) above.     This query and your response, once completed, will be entered into the legal medical record.    Sincerely,  Danni Parry RN  Clinical Documentation Integrity Program     Can this be further clarified as:    NSTEMI type 2 due to cardiac strain from PE  Elevated troponin only  Other, please specify_________  Unable to determine    By submitting this query, we are merely seeking further clarification of documentation to accurately reflect all conditions that you are monitoring, evaluating, treating or that extend the hospitalization or utilize additional resources of care. Please utilize your independent clinical judgment when addressing the question(s) above.     This query and your response, once completed, will be entered into the legal medical record.    Sincerely,  Danni Parry RN  Clinical Documentation Integrity Program

## 2022-11-12 NOTE — PROGRESS NOTES
"Enter Query Response Below      Query Response:       Bilateral pulmonary embolism with acute cor pulmonale       If applicable, please update the problem list.           Patient: Alejandra Varner        : 1950  Account: [id number]           Admit Date: 2022        How to Respond to this query:       a. Click New Note     b. Answer query within the yellow box.                c. Update the Problem List, if applicable.      If you have any questions about this query contact me at: [email or web address]    Dr. Arndt,    Patient is a 72 year old female admitted on 22 with dyspnea found to have bilateral pulmonary embolism status post EKOS and right leg DVT. Echo shows, \"The right ventricular cavity is moderate to severely dilated. Severely reduced right ventricular systolic function noted.\" Pulmonary consult documents, \"RV strain on CT chest.\" Cardiology progress notes on  documents, \"She has a markedly enlarged RV and elevated troponin and BNP consistent with a high risk pulmonary embolus.\" Treated with EKOS therapy, IV heparin then transitioned to eliquis.    Can this be further clarified as:    Bilateral pulmonary embolism with acute cor pulmonale  Bilateral pulmonary embolism without acute cor pulmonale  Other, please specify_________  Unable to determine      By submitting this query, we are merely seeking further clarification of documentation to accurately reflect all conditions that you are monitoring, evaluating, treating or that extend the hospitalization or utilize additional resources of care. Please utilize your independent clinical judgment when addressing the question(s) above.     This query and your response, once completed, will be entered into the legal medical record.    Sincerely,  Danni Parry RN  Clinical Documentation Integrity Program     "

## 2022-11-12 NOTE — PROGRESS NOTES
"Enter Query Response Below      Query Response:     Cardiogenic shock         If applicable, please update the problem list.         Patient: Alejandra Varner        : 1950  Account: [id number]           Admit Date: 2022        How to Respond to this query:       a. Click New Note     b. Answer query within the yellow box.                c. Update the Problem List, if applicable.      If you have any questions about this query contact me at: [email or web address]    Dr. Arndt,    Patient is a 72 year old female admitted on 22 with dyspnea found to have bilateral pulmonary embolism status post EKOS and right leg DVT. Pulmonary consult documents, \"On arrival, she was noted to be slightly hypotensive with BP of 74/50.\" Treated with IV fluids, vasopressor, EKOS therapy, IV heparin then transitioned to eliquis.     Can this be further clarified as:    Cardiogenic shock  Hypovolemic shock  Other, please specify____________  Unable to determine        By submitting this query, we are merely seeking further clarification of documentation to accurately reflect all conditions that you are monitoring, evaluating, treating or that extend the hospitalization or utilize additional resources of care. Please utilize your independent clinical judgment when addressing the question(s) above.     This query and your response, once completed, will be entered into the legal medical record.    Sincerely,  Danni Parry RN  Clinical Documentation Integrity Program     "

## 2022-11-12 NOTE — OUTREACH NOTE
Prep Survey    Flowsheet Row Responses   Orth[facility] facility patient discharged from? [facility]   Is LACE score < 7 ? No   Emergency Room discharge w/ pulse ox? No   Eligibility Readm Mgmt   Discharge diagnosis Bilateral pulmonary embolism    Does the patient have one of the following disease processes/diagnoses(primary or secondary)? Other   Does the patient have Home health ordered? No   Is there a DME ordered? No   Prep survey completed? Yes          KEMAR ULRICH - Registered Nurse

## 2022-11-16 ENCOUNTER — READMISSION MANAGEMENT (OUTPATIENT)
Dept: CALL CENTER | Facility: HOSPITAL | Age: 72
End: 2022-11-16

## 2022-11-16 NOTE — OUTREACH NOTE
Medical Week 1 Survey    Flowsheet Row Responses   [facility] patient discharged from? [place]   Does the patient have one of the following disease processes/diagnoses(primary or secondary)? Other   Week 1 attempt successful? Yes   Call start time 1028   Call end time 1031   Discharge diagnosis Bilateral pulmonary embolism    Person spoke with today (if not patient) and relationship pateitn   Meds reviewed with patient/caregiver? Yes   Is the patient having any side effects they believe may be caused by any medication additions or changes? No   Does the patient have all medications ordered at discharge? Yes   Is the patient taking all medications as directed (includes completed medication regime)? Yes   Does the patient have a primary care provider?  Yes   Does the patient have an appointment with their PCP within 7 days of discharge? Yes   Has the patient kept scheduled appointments due by today? Yes   Psychosocial issues? No   Did the patient receive a copy of their discharge instructions? Yes   Nursing interventions Reviewed instructions with patient   What is the patient's perception of their health status since discharge? Improving   Is the patient/caregiver able to teach back signs and symptoms related to disease process for when to call PCP? Yes   Is the patient/caregiver able to teach back signs and symptoms related to disease process for when to call 911? Yes   Is the patient/caregiver able to teach back the hierarchy of who to call/visit for symptoms/problems? PCP, Specialist, Home health nurse, Urgent Care, ED, 911 Yes   If the patient is a current smoker, are they able to teach back resources for cessation? Not a smoker   Week 1 call completed? Yes   Graduated Yes   Did the patient feel the follow up calls were helpful during their recovery period? Yes   Was the number of calls appropriate? Yes   Graduated/Revoked comments Pateint reports all goals met. No needs or concerns.           DEDRA Zamora  Registered Nurse

## 2022-12-02 LAB — ACT BLD: 121 SECONDS (ref 82–152)

## 2022-12-05 NOTE — PROGRESS NOTES
"Subjective   Patient ID: Alejandra Varner is a 72 y.o. female is here today for 4 week PO follow-up. Patient had a Cervical 5 to cervical 6 and cervical 6 to cervical 7 anterior cervical discectomy, fusion and instrumentation on 10.26.2022    Today patient states that she has numbness in the R finger tips. Patient denies neck pain.     Patient, Provider, and MA are all wearing a mask in our office today.     History of Present Illness     This patient returns today.  She is doing well.  She has no pain down her right arm at all.  She still has some numbness in her right fingertips.  Her incision has healed well.    The following portions of the patient's history were reviewed and updated as appropriate: allergies, current medications, past family history, past medical history, past social history, past surgical history and problem list.    Review of Systems   Constitutional: Negative for chills and fever.   HENT: Negative for congestion.    Musculoskeletal: Negative for neck pain and neck stiffness.   Neurological: Positive for numbness.        R hand       I have reviewed the review of systems as documented by my MA.      Objective     Vitals:    12/06/22 1430   BP: 139/86   Cuff Size: Adult   Pulse: 92   Temp: 98 °F (36.7 °C)   SpO2: 98%   Weight: 92.5 kg (204 lb)   Height: 167.6 cm (66\")     Body mass index is 32.93 kg/m².    Tobacco Use: Unknown   • Smoking Tobacco Use: Never   • Smokeless Tobacco Use: Unknown   • Passive Exposure: Not on file          Physical Exam  Neurological:      Mental Status: She is alert and oriented to person, place, and time.       Neurologic Exam     Mental Status   Oriented to person, place, and time.           Assessment & Plan   Independent Review of Radiographic Studies:      I personally reviewed the images from the following studies.    I reviewed her postoperative films from the hospital and they look okay.    Medical Decision Making:      I told the patient we will go ahead " and start her on some physical therapy now.  I will see her back in 6 weeks with an x-ray.    Diagnoses and all orders for this visit:    1. Follow-up examination following surgery (Primary)  -     Ambulatory Referral to Physical Therapy  -     XR Spine Cervical 2 or 3 View; Future      Return in about 6 weeks (around 1/17/2023).         Answers for HPI/ROS submitted by the patient on 12/1/2022  Please describe your symptoms.: S/P back surgery and bilateral pulmonary embolus post op  Have you had these symptoms before?: No  How long have you been having these symptoms?: Greater than 2 weeks  Please list any medications you are currently taking for this condition.: Eliquis 50 mg bid  What is the primary reason for your visit?: Other

## 2022-12-06 ENCOUNTER — OFFICE VISIT (OUTPATIENT)
Dept: NEUROSURGERY | Facility: CLINIC | Age: 72
End: 2022-12-06

## 2022-12-06 VITALS
HEART RATE: 92 BPM | HEIGHT: 66 IN | TEMPERATURE: 98 F | OXYGEN SATURATION: 98 % | WEIGHT: 204 LBS | BODY MASS INDEX: 32.78 KG/M2 | SYSTOLIC BLOOD PRESSURE: 139 MMHG | DIASTOLIC BLOOD PRESSURE: 86 MMHG

## 2022-12-06 DIAGNOSIS — Z09 FOLLOW-UP EXAMINATION FOLLOWING SURGERY: Primary | ICD-10-CM

## 2022-12-06 PROCEDURE — 99024 POSTOP FOLLOW-UP VISIT: CPT | Performed by: NEUROLOGICAL SURGERY

## 2022-12-09 ENCOUNTER — OFFICE VISIT (OUTPATIENT)
Dept: CARDIOLOGY | Facility: CLINIC | Age: 72
End: 2022-12-09

## 2022-12-09 VITALS
BODY MASS INDEX: 32.24 KG/M2 | DIASTOLIC BLOOD PRESSURE: 82 MMHG | HEIGHT: 66 IN | SYSTOLIC BLOOD PRESSURE: 148 MMHG | WEIGHT: 200.6 LBS | HEART RATE: 74 BPM

## 2022-12-09 DIAGNOSIS — I26.99 BILATERAL PULMONARY EMBOLISM: Primary | ICD-10-CM

## 2022-12-09 PROCEDURE — 93000 ELECTROCARDIOGRAM COMPLETE: CPT | Performed by: INTERNAL MEDICINE

## 2022-12-09 PROCEDURE — 99213 OFFICE O/P EST LOW 20 MIN: CPT | Performed by: INTERNAL MEDICINE

## 2022-12-09 NOTE — PROGRESS NOTES
Date of Office Visit: 22  Encounter Provider: Brian Weems MD  Place of Service: [facility] CARDIOLOGY  Patient Name: Alejandra Varner  :1950  [id number]    Chief Complaint   Patient presents with   • pulmonary emblolism   :     HPI: Alejandra Varner is a 72 y.o. female she is here for follow-up in 2022 she had a submassive pulmonary embolus in the setting of recent neck surgery we did an EKOS therapy on her and she is here for follow-up she is better her breathing is better but she is not quite back to her baseline she feels like her neck is much improved.  She is not had any bleeding difficulty she has a little bit of mild renal insufficiency.    Past Medical History:   Diagnosis Date   • Cervical spinal stenosis     PAIN DOWN BOTH ARMS--RIGHT WORSE, NUMBNESSS/ TINGLING IN RIGHT HAND/ FINGERS AND TINGLING IN LEFT HAND/FINGERS   • Chronic kidney disease     FOLLOWED BY NEPHROLOGY IN [place], [place] (PT UNAWARE OF NAME)   • Diabetes mellitus (HCC)    • Essential hypertension 2022   • GERD (gastroesophageal reflux disease)    • Hernia, hiatal    • History of kidney stones    • Hyperlipidemia    • Hypertension    • Hypothyroidism    • Hypothyroidism (acquired) 2022   • Mixed hyperlipidemia 2022   • PONV (postoperative nausea and vomiting)    • Stage 3a chronic kidney disease (HCC) 2022       Past Surgical History:   Procedure Laterality Date   • ANTERIOR CERVICAL DISCECTOMY W/ FUSION N/A 10/26/2022    Procedure: Cervical 5 to cervical 6 and cervical 6 to cervical 7 anterior cervical discectomy, fusion and instrumentation;  Surgeon: Raciel German MD;  Location: [facility] OR;  Service: Neurosurgery;  Laterality: N/A;   • CARDIAC CATHETERIZATION N/A 2022    Procedure: Thrombolytic Therapy EKOS;  Surgeon: Brian Weems MD;  Location: [facility] CATH INVASIVE LOCATION;  Service: Cardiovascular;  Laterality: N/A;   • COLONOSCOPY     • CYSTOSCOPY  W/ LASER LITHOTRIPSY      MULTIPLE SURGERIES   • EKOS CATHETER PLACEMENT Bilateral 11/7/2022    Procedure: Ekos catheter placement;  Surgeon: Brian Weems MD;  [facility];  Service: Cardiovascular;  Laterality: Bilateral;   • REPLACEMENT TOTAL KNEE Left    • TOTAL ABDOMINAL HYSTERECTOMY     • TUBAL COAGULATION LAPAROSCOPIC         Social History     Socioeconomic History   • Marital status:    Tobacco Use   • Smoking status: Never   Substance and Sexual Activity   • Alcohol use: No   • Drug use: No   • Sexual activity: Defer       Family History   Problem Relation Age of Onset   • Heart disease Mother    • Hypertension Mother    • Diabetes Mother    • Heart disease Father    • Stroke Father    • Hypertension Father    • Diabetes Brother    • Malig Hyperthermia Neg Hx        Review of Systems   Constitutional: Negative for decreased appetite, fever, malaise/fatigue and weight loss.   HENT: Negative for nosebleeds.    Eyes: Negative for double vision.   Cardiovascular: Negative for chest pain, claudication, cyanosis, dyspnea on exertion, irregular heartbeat, leg swelling, near-syncope, orthopnea, palpitations, paroxysmal nocturnal dyspnea and syncope.   Respiratory: Negative for cough, hemoptysis and shortness of breath.    Hematologic/Lymphatic: Negative for bleeding problem.   Skin: Negative for rash.   Musculoskeletal: Negative for falls and myalgias.   Gastrointestinal: Negative for hematochezia, jaundice, melena, nausea and vomiting.   Genitourinary: Negative for hematuria.   Neurological: Negative for dizziness and seizures.   Psychiatric/Behavioral: Negative for altered mental status and memory loss.       No Known Allergies      Current Outpatient Medications:   •  allopurinol (ZYLOPRIM) 100 MG tablet, Take 1 tablet by mouth Every Night., Disp: , Rfl:   •  chlorthalidone (HYGROTON) 25 MG tablet, Take 25 mg by mouth Daily., Disp: , Rfl:   •  Cholecalciferol (Vitamin D) 50  "MCG (2000 UT) tablet, Take 2,000 Units by mouth Every Night., Disp: , Rfl:   •  cyclobenzaprine (FLEXERIL) 5 MG tablet, Take 5 mg by mouth At Night As Needed., Disp: , Rfl:   •  glipizide (GLUCOTROL) 5 MG tablet, Take 1 tablet by mouth 2 (Two) Times a Day Before Meals., Disp: , Rfl:   •  lansoprazole (PREVACID) 30 MG capsule, Take 30 mg by mouth Daily., Disp: , Rfl:   •  levothyroxine (SYNTHROID, LEVOTHROID) 75 MCG tablet, Take 1 tablet by mouth Daily., Disp: , Rfl:   •  pregabalin (LYRICA) 50 MG capsule, Take 1 capsule by mouth Every Night., Disp: , Rfl:   •  propranolol (INDERAL) 40 MG tablet, Take 1 tablet by mouth 2 (Two) Times a Day., Disp: , Rfl:   •  simvastatin (ZOCOR) 40 MG tablet, Take 40 mg by mouth Every Night., Disp: , Rfl:   •  Tirzepatide (Mounjaro) 2.5 MG/0.5ML solution pen-injector, Inject  under the skin into the appropriate area as directed 1 (One) Time Per Week. WEDNESDAYS, Disp: , Rfl:   •  traMADol (ULTRAM) 50 MG tablet, Take 50 mg by mouth 2 (Two) Times a Day As Needed., Disp: , Rfl:   •  apixaban (ELIQUIS) 5 MG tablet tablet, Take 1 tablet by mouth Every 12 (Twelve) Hours for 30 days. Indications: DVT/PE (active thrombosis), Disp: 180 tablet, Rfl: 2      Objective:     Vitals:    12/09/22 1511   BP: 148/82   Pulse: 74   Weight: 91 kg (200 lb 9.6 oz)   Height: 167.6 cm (66\")     Body mass index is 32.38 kg/m².    Constitutional:       Appearance: Well-developed.   Eyes:      General: No scleral icterus.  HENT:      Head: Normocephalic.   Neck:      Thyroid: No thyromegaly.      Vascular: No JVD.      Lymphadenopathy: No cervical adenopathy.   Pulmonary:      Effort: Pulmonary effort is normal.      Breath sounds: Normal breath sounds. No wheezing. No rales.   Cardiovascular:      Normal rate. Regular rhythm.      No gallop.   Edema:     Peripheral edema absent.   Abdominal:      Palpations: Abdomen is soft.      Tenderness: There is no abdominal tenderness.   Musculoskeletal: Normal range of " motion. Skin:     General: Skin is warm and dry.      Findings: No rash.   Neurological:      Mental Status: Alert and oriented to person, place, and time.           ECG 12 Lead    Date/Time: 12/9/2022 3:57 PM  Performed by: Brian Weems MD  Authorized by: Brian Weems MD   Comparison: compared with previous ECG   Rhythm: sinus rhythm    Clinical impression: normal ECG             Assessment:       Diagnosis Plan   1. Bilateral pulmonary embolism (HCC)               Plan:       I think she is doing pretty well I like her to start doing a little bit more activity I Esme have her get an echo when she comes back here in January to see Dr. Ji I will see her back in June at that point probably we will decrease her Eliquis to 2-1/2 twice a day and to stay on that long-term unless she has some dramatic loss in her weight    Return for on the same day as her  in June 2023.     As always, it has been a pleasure to participate in your patient's care.      Sincerely,       Brian Weems MD

## 2022-12-16 ENCOUNTER — TELEPHONE (OUTPATIENT)
Dept: NEUROSURGERY | Facility: CLINIC | Age: 72
End: 2022-12-16

## 2022-12-16 NOTE — TELEPHONE ENCOUNTER
PT called. She stated that the PT in [place] never received her orders. Additionally, she wanted to know if the XRAY order was received as well. Took down information to give to Dr. German . Informed PT  will give her a call at her earliest convenience.

## 2023-02-03 ENCOUNTER — TELEPHONE (OUTPATIENT)
Dept: NEUROSURGERY | Facility: CLINIC | Age: 73
End: 2023-02-03
Payer: MEDICARE

## 2023-02-03 NOTE — TELEPHONE ENCOUNTER
Patient PCP office called and is wondering if you recommend something other than Lyrica and Gabapentin for nerve pain. She is unable to tolerate those rx's.     [phone]

## 2023-03-14 ENCOUNTER — OFFICE VISIT (OUTPATIENT)
Dept: NEUROSURGERY | Facility: CLINIC | Age: 73
End: 2023-03-14
Payer: MEDICARE

## 2023-03-14 ENCOUNTER — HOSPITAL ENCOUNTER (OUTPATIENT)
Dept: CARDIOLOGY | Facility: HOSPITAL | Age: 73
Discharge: HOME | End: 2023-03-14
Admitting: INTERNAL MEDICINE
Payer: MEDICARE

## 2023-03-14 VITALS
DIASTOLIC BLOOD PRESSURE: 72 MMHG | WEIGHT: 200 LBS | HEART RATE: 76 BPM | OXYGEN SATURATION: 98 % | HEIGHT: 66 IN | BODY MASS INDEX: 32.14 KG/M2 | SYSTOLIC BLOOD PRESSURE: 143 MMHG

## 2023-03-14 DIAGNOSIS — I26.99 BILATERAL PULMONARY EMBOLISM: ICD-10-CM

## 2023-03-14 DIAGNOSIS — M48.02 CERVICAL SPINAL STENOSIS: Primary | ICD-10-CM

## 2023-03-14 LAB
AORTIC DIMENSIONLESS INDEX: 0.6 (DI)
ASCENDING AORTA: 3.3 CM
BH CV ECHO MEAS - ACS: 1.79 CM
BH CV ECHO MEAS - AI P1/2T: 460.7 MSEC
BH CV ECHO MEAS - AO MAX PG: 10.2 MMHG
BH CV ECHO MEAS - AO MEAN PG: 5.3 MMHG
BH CV ECHO MEAS - AO V2 MAX: 159.9 CM/SEC
BH CV ECHO MEAS - AO V2 VTI: 38.3 CM
BH CV ECHO MEAS - AVA(I,D): 1.98 CM2
BH CV ECHO MEAS - EDV(CUBED): 28.7 ML
BH CV ECHO MEAS - EDV(MOD-SP2): 73 ML
BH CV ECHO MEAS - EDV(MOD-SP4): 88 ML
BH CV ECHO MEAS - EF(MOD-BP): 58.2 %
BH CV ECHO MEAS - EF(MOD-SP2): 58.9 %
BH CV ECHO MEAS - EF(MOD-SP4): 59.1 %
BH CV ECHO MEAS - ESV(CUBED): 10.2 ML
BH CV ECHO MEAS - ESV(MOD-SP2): 30 ML
BH CV ECHO MEAS - ESV(MOD-SP4): 36 ML
BH CV ECHO MEAS - FS: 29.1 %
BH CV ECHO MEAS - IVS/LVPW: 0.97 CM
BH CV ECHO MEAS - IVSD: 1.29 CM
BH CV ECHO MEAS - LAT PEAK E' VEL: 11.1 CM/SEC
BH CV ECHO MEAS - LV DIASTOLIC VOL/BSA (35-75): 44 CM2
BH CV ECHO MEAS - LV MASS(C)D: 128.9 GRAMS
BH CV ECHO MEAS - LV MAX PG: 5 MMHG
BH CV ECHO MEAS - LV MEAN PG: 2.38 MMHG
BH CV ECHO MEAS - LV SYSTOLIC VOL/BSA (12-30): 18 CM2
BH CV ECHO MEAS - LV V1 MAX: 111.4 CM/SEC
BH CV ECHO MEAS - LV V1 VTI: 24.1 CM
BH CV ECHO MEAS - LVIDD: 3.1 CM
BH CV ECHO MEAS - LVIDS: 2.17 CM
BH CV ECHO MEAS - LVOT AREA: 3.1 CM2
BH CV ECHO MEAS - LVOT DIAM: 2 CM
BH CV ECHO MEAS - LVPWD: 1.33 CM
BH CV ECHO MEAS - MED PEAK E' VEL: 7.3 CM/SEC
BH CV ECHO MEAS - MV A DUR: 0.14 SEC
BH CV ECHO MEAS - MV A MAX VEL: 108.4 CM/SEC
BH CV ECHO MEAS - MV DEC SLOPE: 584.5 CM/SEC2
BH CV ECHO MEAS - MV DEC TIME: 189 MSEC
BH CV ECHO MEAS - MV E MAX VEL: 104 CM/SEC
BH CV ECHO MEAS - MV E/A: 0.96
BH CV ECHO MEAS - MV MAX PG: 6.7 MMHG
BH CV ECHO MEAS - MV MEAN PG: 3.1 MMHG
BH CV ECHO MEAS - MV P1/2T: 64.8 MSEC
BH CV ECHO MEAS - MV V2 VTI: 32.6 CM
BH CV ECHO MEAS - MVA(P1/2T): 3.4 CM2
BH CV ECHO MEAS - MVA(VTI): 2.32 CM2
BH CV ECHO MEAS - PA V2 MAX: 99.1 CM/SEC
BH CV ECHO MEAS - PULM A REVS DUR: 0.18 SEC
BH CV ECHO MEAS - PULM A REVS VEL: 38.1 CM/SEC
BH CV ECHO MEAS - PULM DIAS VEL: 41.2 CM/SEC
BH CV ECHO MEAS - PULM S/D: 1.77
BH CV ECHO MEAS - PULM SYS VEL: 73.1 CM/SEC
BH CV ECHO MEAS - QP/QS: 1.56
BH CV ECHO MEAS - RAP SYSTOLE: 3 MMHG
BH CV ECHO MEAS - RV MAX PG: 2.8 MMHG
BH CV ECHO MEAS - RV V1 MAX: 84 CM/SEC
BH CV ECHO MEAS - RV V1 VTI: 20.2 CM
BH CV ECHO MEAS - RVOT DIAM: 2.7 CM
BH CV ECHO MEAS - SI(MOD-SP2): 21.5 ML/M2
BH CV ECHO MEAS - SI(MOD-SP4): 26 ML/M2
BH CV ECHO MEAS - SUP REN AO DIAM: 1.9 CM
BH CV ECHO MEAS - SV(LVOT): 75.9 ML
BH CV ECHO MEAS - SV(MOD-SP2): 43 ML
BH CV ECHO MEAS - SV(MOD-SP4): 52 ML
BH CV ECHO MEAS - SV(RVOT): 118.5 ML
BH CV ECHO MEAS - TAPSE (>1.6): 2.14 CM
BH CV ECHO MEASUREMENTS AVERAGE E/E' RATIO: 11.3
BH CV XLRA - RV BASE: 3.4 CM
BH CV XLRA - RV LENGTH: 7.2 CM
BH CV XLRA - RV MID: 3.3 CM
BH CV XLRA - TDI S': 9.7 CM/SEC
LEFT ATRIUM VOLUME INDEX: 22.9 ML/M2
MAXIMAL PREDICTED HEART RATE: 147 BPM
SINUS: 2.6 CM
STJ: 1.57 CM
STRESS TARGET HR: 125 BPM

## 2023-03-14 PROCEDURE — 93306 TTE W/DOPPLER COMPLETE: CPT | Performed by: INTERNAL MEDICINE

## 2023-03-14 PROCEDURE — 1160F RVW MEDS BY RX/DR IN RCRD: CPT | Performed by: NEUROLOGICAL SURGERY

## 2023-03-14 PROCEDURE — 93306 TTE W/DOPPLER COMPLETE: CPT

## 2023-03-14 PROCEDURE — 99214 OFFICE O/P EST MOD 30 MIN: CPT | Performed by: NEUROLOGICAL SURGERY

## 2023-03-14 PROCEDURE — 1159F MED LIST DOCD IN RCRD: CPT | Performed by: NEUROLOGICAL SURGERY

## 2023-03-14 RX ORDER — APIXABAN 5 MG/1
TABLET, FILM COATED ORAL
COMMUNITY
Start: 2023-03-06

## 2023-03-14 RX ORDER — AMITRIPTYLINE HYDROCHLORIDE 10 MG/1
TABLET, FILM COATED ORAL
COMMUNITY
Start: 2023-03-06

## 2023-03-14 NOTE — PROGRESS NOTES
Subjective   Patient ID: Alejandra Varner is a 72 y.o. female is here today for 6 week follow-up with a new XR C-spine.     Today patient states that she has numbness in B/L hands, along with burning in the R hand, and neck popping    Patient, Provider, and MA are all wearing a mask in our office today    History of Present Illness     This patient continues with some numbness in her hands.  It is worse on the right than the left.  Her neck and arms otherwise feels okay.    The following portions of the patient's history were reviewed and updated as appropriate: allergies, current medications, past family history, past medical history, past social history, past surgical history and problem list.    Review of Systems   Constitutional: Negative for chills and fever.   HENT: Negative for congestion.    Musculoskeletal: Positive for neck pain and neck stiffness.   Neurological: Positive for weakness and numbness.       I reviewed the review of systems listed by the patient and discussed by my MA    Objective     There were no vitals filed for this visit.  There is no height or weight on file to calculate BMI.    Tobacco Use: Unknown   • Smoking Tobacco Use: Never   • Smokeless Tobacco Use: Unknown   • Passive Exposure: Not on file          Physical Exam  Neurological:      Mental Status: She is alert and oriented to person, place, and time.       Neurologic Exam     Mental Status   Oriented to person, place, and time.           Assessment & Plan   Independent Review of Radiographic Studies:      I personally reviewed the images from the following studies.    I reviewed her x-rays which were done in her home hospital and these look okay.  There is good alignment of the construct.    Medical Decision Making:      I told the patient we will go ahead and check a EMG of both arms to be sure she does not also have carpal tunnel.  I will call her with the results.  Otherwise I think she can return to normal  activities.    Diagnoses and all orders for this visit:    1. Cervical spinal stenosis (Primary)  -     EMG & Nerve Conduction Test; Future      Return for Call with results.

## 2023-03-15 ENCOUNTER — TELEPHONE (OUTPATIENT)
Dept: CARDIOLOGY | Facility: CLINIC | Age: 73
End: 2023-03-15
Payer: MEDICARE

## 2023-03-17 DIAGNOSIS — I27.20 PULMONARY HYPERTENSION: Primary | ICD-10-CM

## 2023-03-20 ENCOUNTER — TELEPHONE (OUTPATIENT)
Dept: NEUROSURGERY | Facility: CLINIC | Age: 73
End: 2023-03-20

## 2023-03-20 NOTE — TELEPHONE ENCOUNTER
Caller: Alejandra Varner    Relationship: Self    Best call back number:[phone]        What specialty or service is being requested: EMG TESTING    What is the provider, practice or medical service name: PT WOULD LIKE OTHER OPTIONS, WILLING TO COME TO [facility].         Any additional details: PT WAS REFERRED TO [facility] FOR EMG TESTING. SHE IS SCHEDULED WITH [facility] ON 6-30-23 BUT BEEN READING ON LINE REVIEWS AND SHE IS NOT HAPPY WITH WHAT SHE IS READING ABOUT HIM AND WOULD LIKE OTHER OPTIONS FOR PROVIDERS.     PLEASE CALL BACK TO DISCUSS     THANK YOU,

## 2023-05-01 NOTE — TELEPHONE ENCOUNTER
Routed referral for EMG to [place] [facility] to schedule and LVM for patient that she should be hearing from [place] [facility] to schedule.

## 2023-06-16 ENCOUNTER — HOSPITAL ENCOUNTER (OUTPATIENT)
Dept: CARDIOLOGY | Facility: HOSPITAL | Age: 73
Discharge: HOME | End: 2023-06-16
Payer: MEDICARE

## 2023-06-16 ENCOUNTER — OFFICE VISIT (OUTPATIENT)
Dept: CARDIOLOGY | Facility: CLINIC | Age: 73
End: 2023-06-16
Payer: MEDICARE

## 2023-06-16 VITALS
WEIGHT: 196 LBS | SYSTOLIC BLOOD PRESSURE: 130 MMHG | HEIGHT: 66 IN | HEART RATE: 77 BPM | BODY MASS INDEX: 31.5 KG/M2 | DIASTOLIC BLOOD PRESSURE: 74 MMHG

## 2023-06-16 DIAGNOSIS — I10 ESSENTIAL HYPERTENSION: Primary | Chronic | ICD-10-CM

## 2023-06-16 DIAGNOSIS — I26.99 BILATERAL PULMONARY EMBOLISM: ICD-10-CM

## 2023-06-16 DIAGNOSIS — I27.20 PULMONARY HYPERTENSION: ICD-10-CM

## 2023-06-16 DIAGNOSIS — I49.3 PVC (PREMATURE VENTRICULAR CONTRACTION): ICD-10-CM

## 2023-06-16 DIAGNOSIS — E66.09 CLASS 2 OBESITY DUE TO EXCESS CALORIES WITHOUT SERIOUS COMORBIDITY WITH BODY MASS INDEX (BMI) OF 35.0 TO 35.9 IN ADULT: ICD-10-CM

## 2023-06-16 LAB
AORTIC ARCH: 2.4 CM
ASCENDING AORTA: 3.3 CM
BH CV ECHO MEAS - ACS: 1.77 CM
BH CV ECHO MEAS - AO ROOT DIAM: 2.9 CM
BH CV ECHO MEAS - EDV(CUBED): 91.1 ML
BH CV ECHO MEAS - EDV(MOD-SP2): 75 ML
BH CV ECHO MEAS - EDV(MOD-SP4): 91 ML
BH CV ECHO MEAS - EF(MOD-BP): 66.4 %
BH CV ECHO MEAS - EF(MOD-SP2): 69.3 %
BH CV ECHO MEAS - EF(MOD-SP4): 65.9 %
BH CV ECHO MEAS - ESV(CUBED): 10.6 ML
BH CV ECHO MEAS - ESV(MOD-SP2): 23 ML
BH CV ECHO MEAS - ESV(MOD-SP4): 31 ML
BH CV ECHO MEAS - FS: 51.2 %
BH CV ECHO MEAS - IVS/LVPW: 1.25 CM
BH CV ECHO MEAS - IVSD: 1 CM
BH CV ECHO MEAS - LAT PEAK E' VEL: 11.2 CM/SEC
BH CV ECHO MEAS - LV DIASTOLIC VOL/BSA (35-75): 45.9 CM2
BH CV ECHO MEAS - LV MASS(C)D: 132.8 GRAMS
BH CV ECHO MEAS - LV SYSTOLIC VOL/BSA (12-30): 15.6 CM2
BH CV ECHO MEAS - LVIDD: 4.5 CM
BH CV ECHO MEAS - LVIDS: 2.2 CM
BH CV ECHO MEAS - LVOT AREA: 2.9 CM2
BH CV ECHO MEAS - LVOT DIAM: 1.92 CM
BH CV ECHO MEAS - LVPWD: 0.8 CM
BH CV ECHO MEAS - MED PEAK E' VEL: 7.7 CM/SEC
BH CV ECHO MEAS - MV A DUR: 0.09 SEC
BH CV ECHO MEAS - MV A MAX VEL: 90.4 CM/SEC
BH CV ECHO MEAS - MV DEC SLOPE: 387.5 CM/SEC2
BH CV ECHO MEAS - MV DEC TIME: 0.17 MSEC
BH CV ECHO MEAS - MV E MAX VEL: 79.3 CM/SEC
BH CV ECHO MEAS - MV E/A: 0.88
BH CV ECHO MEAS - MV MAX PG: 4.5 MMHG
BH CV ECHO MEAS - MV MEAN PG: 1.9 MMHG
BH CV ECHO MEAS - MV P1/2T: 73.9 MSEC
BH CV ECHO MEAS - MV V2 VTI: 29.9 CM
BH CV ECHO MEAS - MVA(P1/2T): 3 CM2
BH CV ECHO MEAS - PA ACC TIME: 0.15 SEC
BH CV ECHO MEAS - PA V2 MAX: 83.8 CM/SEC
BH CV ECHO MEAS - PI END-D VEL: 75.2 CM/SEC
BH CV ECHO MEAS - PULM A REVS DUR: 0.11 SEC
BH CV ECHO MEAS - PULM A REVS VEL: 39.8 CM/SEC
BH CV ECHO MEAS - PULM DIAS VEL: 40.3 CM/SEC
BH CV ECHO MEAS - PULM S/D: 1.71
BH CV ECHO MEAS - PULM SYS VEL: 69 CM/SEC
BH CV ECHO MEAS - RAP SYSTOLE: 3 MMHG
BH CV ECHO MEAS - RV MAX PG: 1.63 MMHG
BH CV ECHO MEAS - RV V1 MAX: 63.8 CM/SEC
BH CV ECHO MEAS - RV V1 VTI: 15.8 CM
BH CV ECHO MEAS - RVOT DIAM: 1.84 CM
BH CV ECHO MEAS - RVSP: 22 MMHG
BH CV ECHO MEAS - SI(MOD-SP2): 26.2 ML/M2
BH CV ECHO MEAS - SI(MOD-SP4): 30.3 ML/M2
BH CV ECHO MEAS - SUP REN AO DIAM: 1.9 CM
BH CV ECHO MEAS - SV(MOD-SP2): 52 ML
BH CV ECHO MEAS - SV(MOD-SP4): 60 ML
BH CV ECHO MEAS - SV(RVOT): 41.8 ML
BH CV ECHO MEAS - TAPSE (>1.6): 1.87 CM
BH CV ECHO MEAS - TR MAX PG: 19.1 MMHG
BH CV ECHO MEAS - TR MAX VEL: 218.3 CM/SEC
BH CV ECHO MEASUREMENTS AVERAGE E/E' RATIO: 8.39
BH CV XLRA - RV BASE: 3.5 CM
BH CV XLRA - RV LENGTH: 6.2 CM
BH CV XLRA - RV MID: 2.8 CM
BH CV XLRA - TDI S': 15.1 CM/SEC
LEFT ATRIUM VOLUME INDEX: 13.4 ML/M2
SINUS: 3.5 CM
STJ: 2.6 CM

## 2023-06-16 PROCEDURE — 25510000001 PERFLUTREN (DEFINITY) 8.476 MG IN SODIUM CHLORIDE (PF) 0.9 % 10 ML INJECTION: Performed by: INTERNAL MEDICINE

## 2023-06-16 PROCEDURE — 93306 TTE W/DOPPLER COMPLETE: CPT

## 2023-06-16 PROCEDURE — 93306 TTE W/DOPPLER COMPLETE: CPT | Performed by: INTERNAL MEDICINE

## 2023-06-16 RX ADMIN — PERFLUTREN 1.5 ML: 6.52 INJECTION, SUSPENSION INTRAVENOUS at 12:11

## 2023-06-16 NOTE — PROGRESS NOTES
Date of Office Visit: 23  Encounter Provider: Brian Weems MD  Place of Service: [facility] CARDIOLOGY  Patient Name: Alejandra Varner  :1950  [id number]    Chief Complaint   Patient presents with    Pulmonary Embolism     Follow-up   :     HPI: Alejandra Varner is a 72 y.o. female she is here for follow-up in 2022 she had a submassive pulmonary embolus in the setting of recent neck surgery we did an EKOS therapy on her and she is here for follow-up.  She is doing well not having really shortness of breath.  She does describe when she gets up and walks around that her heart rate still jumps up pretty high.  No PND no orthopnea no edema no pain in her legs she is having some tingling in her right hand.  Her neck is sore but it is better than it was    Past Medical History:   Diagnosis Date    Cervical spinal stenosis     PAIN DOWN BOTH ARMS--RIGHT WORSE, NUMBNESSS/ TINGLING IN RIGHT HAND/ FINGERS AND TINGLING IN LEFT HAND/FINGERS    Chronic kidney disease     FOLLOWED BY NEPHROLOGY IN [place], [place] (PT UNAWARE OF NAME)    Diabetes mellitus     Essential hypertension 2022    GERD (gastroesophageal reflux disease)     Hernia, hiatal     History of kidney stones     Hyperlipidemia     Hypertension     Hypothyroidism     Hypothyroidism (acquired) 2022    Mixed hyperlipidemia 2022    PONV (postoperative nausea and vomiting)     Stage 3a chronic kidney disease 2022       Past Surgical History:   Procedure Laterality Date    ANTERIOR CERVICAL DISCECTOMY W/ FUSION N/A 10/26/2022    Procedure: Cervical 5 to cervical 6 and cervical 6 to cervical 7 anterior cervical discectomy, fusion and instrumentation;  Surgeon: Raciel German MD;  Location: [facility] OR;  Service: Neurosurgery;  Laterality: N/A;    CARDIAC CATHETERIZATION N/A 2022    Procedure: Thrombolytic Therapy EKOS;  Surgeon: Brian Weems MD;  Location:  INVASIVE LOCATION;   Service: Cardiovascular;  Laterality: N/A;    COLONOSCOPY      CYSTOSCOPY W/ LASER LITHOTRIPSY      MULTIPLE SURGERIES    EKOS CATHETER PLACEMENT Bilateral 11/7/2022    Procedure: Ekos catheter placement;  Surgeon: Brian Weems MD;  Location: [facility] INVASIVE LOCATION;  Service: Cardiovascular;  Laterality: Bilateral;    REPLACEMENT TOTAL KNEE Left     TOTAL ABDOMINAL HYSTERECTOMY      TUBAL COAGULATION LAPAROSCOPIC         Social History     Socioeconomic History    Marital status:    Tobacco Use    Smoking status: Never   Substance and Sexual Activity    Alcohol use: No    Drug use: No    Sexual activity: Defer       Family History   Problem Relation Age of Onset    Heart disease Mother     Hypertension Mother     Diabetes Mother     Heart disease Father     Stroke Father     Hypertension Father     Diabetes Brother     Malig Hyperthermia Neg Hx        Review of Systems   Constitutional: Negative for decreased appetite, fever, malaise/fatigue and weight loss.   HENT:  Negative for nosebleeds.    Eyes:  Negative for double vision.   Cardiovascular:  Negative for chest pain, claudication, cyanosis, dyspnea on exertion, irregular heartbeat, leg swelling, near-syncope, orthopnea, palpitations, paroxysmal nocturnal dyspnea and syncope.   Respiratory:  Negative for cough, hemoptysis and shortness of breath.    Hematologic/Lymphatic: Negative for bleeding problem.   Skin:  Negative for rash.   Musculoskeletal:  Negative for falls and myalgias.   Gastrointestinal:  Negative for hematochezia, jaundice, melena, nausea and vomiting.   Genitourinary:  Negative for hematuria.   Neurological:  Negative for dizziness and seizures.   Psychiatric/Behavioral:  Negative for altered mental status and memory loss.      No Known Allergies      Current Outpatient Medications:     allopurinol (ZYLOPRIM) 100 MG tablet, Take 1 tablet by mouth Every Night., Disp: , Rfl:     chlorthalidone (HYGROTON) 25 MG tablet, Take 1  "tablet by mouth Daily., Disp: , Rfl:     Cholecalciferol (Vitamin D) 50 MCG (2000 UT) tablet, Take 2,000 Units by mouth Every Night., Disp: , Rfl:     cyclobenzaprine (FLEXERIL) 5 MG tablet, Take 1 tablet by mouth At Night As Needed., Disp: , Rfl:     Eliquis 5 MG tablet tablet, Take 1 tablet by mouth Every 12 (Twelve) Hours., Disp: , Rfl:     glipizide (GLUCOTROL) 5 MG tablet, Take 1 tablet by mouth 2 (Two) Times a Day Before Meals., Disp: , Rfl:     lansoprazole (PREVACID) 30 MG capsule, Take 1 capsule by mouth Daily., Disp: , Rfl:     levothyroxine (SYNTHROID, LEVOTHROID) 75 MCG tablet, Take 1 tablet by mouth Daily., Disp: , Rfl:     simvastatin (ZOCOR) 40 MG tablet, Take 1 tablet by mouth Every Night., Disp: , Rfl:     traMADol (ULTRAM) 50 MG tablet, Take 1 tablet by mouth 3 (Three) Times a Day., Disp: , Rfl:     amitriptyline (ELAVIL) 10 MG tablet, , Disp: , Rfl:     diclofenac (VOLTAREN) 50 MG EC tablet, , Disp: , Rfl:     pregabalin (LYRICA) 50 MG capsule, Take 1 capsule by mouth Every Night., Disp: , Rfl:     propranolol (INDERAL) 40 MG tablet, Take 1 tablet by mouth 2 (Two) Times a Day., Disp: , Rfl:     Tirzepatide (Mounjaro) 2.5 MG/0.5ML solution pen-injector, Inject  under the skin into the appropriate area as directed 1 (One) Time Per Week. WEDNESDAYS, Disp: , Rfl:       Objective:     Vitals:    06/16/23 0953   BP: 130/74   BP Location: Left arm   Patient Position: Sitting   Pulse: 77   Weight: 88.9 kg (196 lb)   Height: 167.6 cm (66\")     Body mass index is 31.64 kg/m².    Constitutional:       Appearance: Well-developed.   Eyes:      General: No scleral icterus.  HENT:      Head: Normocephalic.   Neck:      Thyroid: No thyromegaly.      Vascular: No JVD.      Lymphadenopathy: No cervical adenopathy.   Pulmonary:      Effort: Pulmonary effort is normal.      Breath sounds: Normal breath sounds. No wheezing. No rales.   Cardiovascular:      Normal rate. Regular rhythm.      No gallop.    Edema:     " Peripheral edema absent.   Abdominal:      Palpations: Abdomen is soft.      Tenderness: There is no abdominal tenderness.   Musculoskeletal: Normal range of motion. Skin:     General: Skin is warm and dry.      Findings: No rash.   Neurological:      Mental Status: Alert and oriented to person, place, and time.         ECG 12 Lead    Date/Time: 6/16/2023 10:34 AM  Performed by: Brian Weems MD  Authorized by: Brian Weems MD   Comparison: compared with previous ECG   Similar to previous ECG  Rhythm: sinus rhythm    Clinical impression: normal ECG         Assessment:       Diagnosis Plan   1. Essential hypertension        2. PVC (premature ventricular contraction)        3. Bilateral pulmonary embolism        4. Class 2 obesity due to excess calories without serious comorbidity with body mass index (BMI) of 35.0 to 35.9 in adult               Plan:       I think she is doing pretty well.  We are going to get an echo on her today and look at her RV function and see and make sure that that is recovered if it has I Esme decrease her Eliquis to 2-1/2 twice a day and keep her on that long-term.  Probably the heart rate going up with activity is due to deconditioning and I encouraged her to try and walk I will have her come back and see us in a year    No follow-ups on file.     As always, it has been a pleasure to participate in your patient's care.      Sincerely,       Brian Weems MD

## 2024-02-01 ENCOUNTER — TELEPHONE (OUTPATIENT)
Dept: NEUROLOGY | Facility: CLINIC | Age: 74
End: 2024-02-01
Payer: MEDICARE

## 2024-02-01 NOTE — TELEPHONE ENCOUNTER
Provider: DR AGUIRRE    Caller: PATIENT    Relationship to Patient: SELF    Phone Number: [phone]    Reason for Call: PATIENT STATES SHE HAS OON [employer]. STATES THE EMG HAS TO BE PRE-AUTHORIZED. APPT IS TOMORROW. WOULD LIKE TO MAKE SURE THAT HAS BEEN DONE. PLEASE REVIEW AND ADVISE, THANK YOU.

## 2024-02-01 NOTE — TELEPHONE ENCOUNTER
"Called pt and advised [facility] and [facility]  Pt became aggressive, stated she would not jump through any more hoops, and will cancel this appointment if she is has any further \"issues\"  I advised pt that she is more than welcome to cancel the appointment if that is what she chooses.   "

## 2024-02-02 ENCOUNTER — PROCEDURE VISIT (OUTPATIENT)
Dept: NEUROLOGY | Facility: CLINIC | Age: 74
End: 2024-02-02
Payer: MEDICARE

## 2024-02-02 DIAGNOSIS — G56.03 BILATERAL CARPAL TUNNEL SYNDROME: Primary | ICD-10-CM

## 2024-02-02 NOTE — PROGRESS NOTES
"EMG and Nerve Conduction Studies    I.      Instrument used: Neuromax 1002  II.     Please see data sheets for tabular summary of NCS and details on methods, temperatures and lab standards.   III.    EMG muscles tested for upper extremity studies include the deltoid, biceps, triceps, pronator teres, extensor digitorum communis, first dorsal interosseous and abductor pollicis brevis.    IV.   EMG muscles tested for lower extremity studies include the vastus lateralis, tibialis anterior, peroneus longus, medial gastrocnemius and extensor digitorum brevis.    V.    Additional muscles tested as needed.  Paraspinal muscles tested as needed.   VI.   Please see data sheets for tabular summary of EMG findings.   VII. The complete report includes the data sheets.      Indication:  History:      Ht:  Wt:   HbA1C:   Lab Results   Component Value Date    HGBA1C 8.70 (H) 11/10/2022     TSH: No results found for: \"TSH\"    Technical summary:    Results:    Impression:    Philipp Alejo M.D.              Dictated utilizing Dragon dictation.   "

## 2024-02-02 NOTE — PROGRESS NOTES
"EMG and Nerve Conduction Studies    I.      Instrument used: Neuromax 1002  II.     Please see data sheets for tabular summary of NCS and details on methods, temperatures and lab standards.   III.    EMG muscles tested for upper extremity studies include the deltoid, biceps, triceps, pronator teres, extensor digitorum communis, first dorsal interosseous and abductor pollicis brevis.    IV.   EMG muscles tested for lower extremity studies include the vastus lateralis, tibialis anterior, peroneus longus, medial gastrocnemius and extensor digitorum brevis.    V.    Additional muscles tested as needed.  Paraspinal muscles tested as needed.   VI.   Please see data sheets for tabular summary of EMG findings.   VII. The complete report includes the data sheets.      Indication: Bilateral hand numbness tingling and pain plus right shoulder pain radiating to the elbow  History: 73-year-old white female with 12-year history of diabetes who had an anterior cervical discectomy with fusion 10/2022 who describes some pain getting worse again which seems to originate at the right glenoid radiating to the elbow plus she describes numbness and tingling and hypersensitivity on the right digits 1-3 and increased sensitivity on the left digits 2 and 3.  She states feet are asymptomatic.  Hands feel worse at night.  She denies particular pain over the pronator regions of the forearms    The patient is on Eliquis permanently for bilateral pulmonary emboli after her neck operation.    Ht: 167.6 cm  Wt: 88.9 kg  HbA1C:   Lab Results   Component Value Date    HGBA1C 8.70 (H) 11/10/2022     TSH: No results found for: \"TSH\"    Technical summary:  Nerve conduction studies were obtained in both arms.  Skin temperatures were at least 32 °C measured on the palms.  Needle examination was obtained on selected muscles in both arms.    Results:  1.  Absent right median antidromic sensory potential.  Prolonged left median antidromic sensory distal " latency at 4.9 ms with very low amplitude of 4.3 µV.  2.  Normal ulnar antidromic sensory distal latencies and amplitudes bilaterally.  3.  Normal right radial sensory distal latency and amplitude.  4.  Very severely prolonged right median motor distal latency at 10.1 ms with slow conduction velocity of 39.1 m/s.  Low amplitude of 3.3 mV from wrist stimulation.  Severely prolonged left median motor distal latency at 6.3 ms with a normal conduction velocity and amplitude from wrist stimulation.  5.  Normal ulnar motor conduction velocities with normal distal latencies and amplitudes bilaterally.  6.  Needle examination of selected muscles in both arms including several additional muscles in the right arm showed complex repetitive potentials in the right pronator teres with a mild increased number of large motor units increased firing rate and reduced interference pattern.  There were 1+ fibrillations and positive sharp waves in the right abductor pollicis brevis with an increased number of large motor units increased firing rates and reduced interference patterns in both abductor pollicis brevis muscles.  All other muscles tested in both arms showed normal insertional activities, motor units and recruitment patterns.  Cervical paraspinals were not studied since the patient is on Eliquis.    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  There is slowing of the right median motor conduction velocity in the forearm plus some irritative changes in the pronator teres which could go along with proximal median nerve involvement in the vicinity of the pronator muscle or perhaps a ligament of Burke.  I cannot exclude a C6 or C7 radiculopathy instead but multiple other muscles which I tested did not show any other abnormalities to help confirm that is the case.  Paraspinals were not done since she is on Eliquis.  Study results were  discussed with the patient.    Philipp Alejo M.D.      Addendum:  Brief power testing demonstrates weakness in the abductor pollicis brevis muscles bilaterally but there was no weakness noted in the right flexor pollicis longus or pronator teres to point clinically at a proximal median neuropathy.  GNS        Dictated utilizing Dragon dictation.

## 2024-02-05 DIAGNOSIS — M48.02 CERVICAL SPINAL STENOSIS: ICD-10-CM

## 2024-02-16 ENCOUNTER — TELEPHONE (OUTPATIENT)
Dept: NEUROSURGERY | Facility: CLINIC | Age: 74
End: 2024-02-16
Payer: MEDICARE

## 2024-02-19 NOTE — PROGRESS NOTES
Subjective   Patient ID: Alejandra Varner is a 73 y.o. female is here today for follow-up with a new EMG/NCS done on 2/2/2024.    Today patient states that her symptoms are worsening    History of Present Illness    This patient was last here in March 2023.  At that time she was having numbness in her hands worse on the right than the left.  Since then the symptoms are similar but have gotten worse.  I ordered an EMG at that visit but it did not get done until earlier this month.  He did have a C5-6 and C6-7 ACDF in October 2022.    The following portions of the patient's history were reviewed and updated as appropriate: allergies, current medications, past family history, past medical history, past social history, past surgical history, and problem list.    Review of Systems   Constitutional:  Negative for chills and fever.   HENT:  Negative for congestion.    Musculoskeletal:  Positive for myalgias.   Neurological:  Positive for weakness and numbness. Negative for headaches.       I reviewed the review of systems listed by the patient and discussed by my MA    Objective     There were no vitals filed for this visit.  There is no height or weight on file to calculate BMI.    Tobacco Use: Unknown (2/21/2024)    Patient History     Smoking Tobacco Use: Never     Smokeless Tobacco Use: Unknown     Passive Exposure: Not on file          Physical Exam  Neurological:      Mental Status: She is alert and oriented to person, place, and time.       Neurologic Exam     Mental Status   Oriented to person, place, and time.           Assessment & Plan   Independent Review of Radiographic Studies:      I personally reviewed the images from the following studies.    The EMG shows bilateral median neuropathies severe on the left and very severe on the right.    Medical Decision Making:      I told the patient that from my point of view I think she needs to see a hand surgeon.  She would like to see someone in [place] and I  think that is fine.  We will give her a copy of the referral as well as a copy of her EMG.  I told her to call me if anything else happens.    Diagnoses and all orders for this visit:    1. Bilateral carpal tunnel syndrome (Primary)  -     Ambulatory Referral to Hand Surgery      Return if symptoms worsen or fail to improve.

## 2024-02-21 ENCOUNTER — OFFICE VISIT (OUTPATIENT)
Dept: NEUROSURGERY | Facility: CLINIC | Age: 74
End: 2024-02-21
Payer: MEDICARE

## 2024-02-21 DIAGNOSIS — G56.03 BILATERAL CARPAL TUNNEL SYNDROME: Primary | ICD-10-CM

## 2024-02-21 PROCEDURE — 1159F MED LIST DOCD IN RCRD: CPT | Performed by: NEUROLOGICAL SURGERY

## 2024-02-21 PROCEDURE — 1160F RVW MEDS BY RX/DR IN RCRD: CPT | Performed by: NEUROLOGICAL SURGERY

## 2024-02-21 PROCEDURE — 99213 OFFICE O/P EST LOW 20 MIN: CPT | Performed by: NEUROLOGICAL SURGERY

## 2024-07-09 ENCOUNTER — OFFICE VISIT (OUTPATIENT)
Dept: CARDIOLOGY | Facility: CLINIC | Age: 74
End: 2024-07-09
Payer: MEDICARE

## 2024-07-09 VITALS
HEART RATE: 70 BPM | WEIGHT: 191.2 LBS | HEIGHT: 66 IN | DIASTOLIC BLOOD PRESSURE: 70 MMHG | SYSTOLIC BLOOD PRESSURE: 118 MMHG | BODY MASS INDEX: 30.73 KG/M2

## 2024-07-09 DIAGNOSIS — I49.3 PVC (PREMATURE VENTRICULAR CONTRACTION): ICD-10-CM

## 2024-07-09 DIAGNOSIS — I26.99 BILATERAL PULMONARY EMBOLISM: Primary | ICD-10-CM

## 2024-07-09 DIAGNOSIS — E66.09 CLASS 2 OBESITY DUE TO EXCESS CALORIES WITHOUT SERIOUS COMORBIDITY WITH BODY MASS INDEX (BMI) OF 35.0 TO 35.9 IN ADULT: ICD-10-CM

## 2024-07-09 DIAGNOSIS — I10 ESSENTIAL HYPERTENSION: Chronic | ICD-10-CM

## 2024-07-09 PROCEDURE — 99214 OFFICE O/P EST MOD 30 MIN: CPT | Performed by: INTERNAL MEDICINE

## 2024-07-09 PROCEDURE — 3078F DIAST BP <80 MM HG: CPT | Performed by: INTERNAL MEDICINE

## 2024-07-09 PROCEDURE — 3074F SYST BP LT 130 MM HG: CPT | Performed by: INTERNAL MEDICINE

## 2024-07-09 PROCEDURE — 93000 ELECTROCARDIOGRAM COMPLETE: CPT | Performed by: INTERNAL MEDICINE

## 2024-07-09 RX ORDER — TIRZEPATIDE 10 MG/.5ML
10 INJECTION, SOLUTION SUBCUTANEOUS WEEKLY
COMMUNITY

## 2024-07-09 NOTE — PROGRESS NOTES
Date of Office Visit: 24  Encounter Provider: Brian Weems MD  Place of Service: [facility] CARDIOLOGY  Patient Name: Alejandra Varner  :1950  [id number]    Chief Complaint   Patient presents with    Pulmonary Embolism   :     HPI: Alejandra Varner is a 73 y.o. female she is here for follow-up in 2022 she had a submassive pulmonary embolus in the setting of recent neck surgery we did an EKOS therapy on her and she is here for follow-up.  Her echo last year was normal and her pulmonary pressures were normal.  She is here for follow-up she is doing well bruises easily she is lost about 35 pounds.  No shortness of breath no swelling in her feet no pains in her legs    Past Medical History:   Diagnosis Date    Cervical spinal stenosis     PAIN DOWN BOTH ARMS--RIGHT WORSE, NUMBNESSS/ TINGLING IN RIGHT HAND/ FINGERS AND TINGLING IN LEFT HAND/FINGERS    Chronic kidney disease     FOLLOWED BY NEPHROLOGY IN [place], [place] (PT UNAWARE OF NAME)    Diabetes mellitus     Essential hypertension 2022    GERD (gastroesophageal reflux disease)     Hernia, hiatal     History of kidney stones     Hyperlipidemia     Hypertension     Hypothyroidism     Hypothyroidism (acquired) 2022    Mixed hyperlipidemia 2022    PONV (postoperative nausea and vomiting)     Stage 3a chronic kidney disease 2022       Past Surgical History:   Procedure Laterality Date    ANTERIOR CERVICAL DISCECTOMY W/ FUSION N/A 10/26/2022    Procedure: Cervical 5 to cervical 6 and cervical 6 to cervical 7 anterior cervical discectomy, fusion and instrumentation;  Surgeon: Raciel German MD;  Location: [facility] MAIN OR;  Service: Neurosurgery;  Laterality: N/A;    CARDIAC CATHETERIZATION N/A 2022    Procedure: Thrombolytic Therapy EKOS;  Surgeon: Brian Weems MD;  Location: [facility] CATH INVASIVE LOCATION;  Service: Cardiovascular;  Laterality: N/A;    COLONOSCOPY      CYSTOSCOPY W/ LASER  LITHOTRIPSY      MULTIPLE SURGERIES    EKOS CATHETER PLACEMENT Bilateral 11/7/2022    Procedure: Ekos catheter placement;  Surgeon: Brian Weems MD;  Location:  [facility] CATH INVASIVE LOCATION;  Service: Cardiovascular;  Laterality: Bilateral;    REPLACEMENT TOTAL KNEE Left     TOTAL ABDOMINAL HYSTERECTOMY      TUBAL COAGULATION LAPAROSCOPIC         Social History     Socioeconomic History    Marital status:    Tobacco Use    Smoking status: Never   Vaping Use    Vaping status: Never Used   Substance and Sexual Activity    Alcohol use: No    Drug use: No    Sexual activity: Defer       Family History   Problem Relation Age of Onset    Heart disease Mother     Hypertension Mother     Diabetes Mother     Heart disease Father     Stroke Father     Hypertension Father     Diabetes Brother     Malig Hyperthermia Neg Hx        Review of Systems   Constitutional: Negative for decreased appetite, fever, malaise/fatigue and weight loss.   HENT:  Negative for nosebleeds.    Eyes:  Negative for double vision.   Cardiovascular:  Negative for chest pain, claudication, cyanosis, dyspnea on exertion, irregular heartbeat, leg swelling, near-syncope, orthopnea, palpitations, paroxysmal nocturnal dyspnea and syncope.   Respiratory:  Negative for cough, hemoptysis and shortness of breath.    Hematologic/Lymphatic: Negative for bleeding problem.   Skin:  Negative for rash.   Musculoskeletal:  Negative for falls and myalgias.   Gastrointestinal:  Negative for hematochezia, jaundice, melena, nausea and vomiting.   Genitourinary:  Negative for hematuria.   Neurological:  Negative for dizziness and seizures.   Psychiatric/Behavioral:  Negative for altered mental status and memory loss.        No Known Allergies      Current Outpatient Medications:     allopurinol (ZYLOPRIM) 100 MG tablet, Take 1 tablet by mouth Every Night., Disp: , Rfl:     apixaban (ELIQUIS) 2.5 MG tablet tablet, Take 1 tablet by mouth 2 (Two) Times a Day., Disp:  "180 tablet, Rfl: 3    chlorthalidone (HYGROTON) 25 MG tablet, Take 1 tablet by mouth Daily., Disp: , Rfl:     Cholecalciferol (Vitamin D) 50 MCG (2000 UT) tablet, Take 1 tablet by mouth Every Night., Disp: , Rfl:     cyclobenzaprine (FLEXERIL) 5 MG tablet, Take 1 tablet by mouth At Night As Needed., Disp: , Rfl:     diclofenac (VOLTAREN) 50 MG EC tablet, , Disp: , Rfl:     glipizide (GLUCOTROL) 5 MG tablet, Take 1 tablet by mouth 2 (Two) Times a Day Before Meals., Disp: , Rfl:     lansoprazole (PREVACID) 30 MG capsule, Take 1 capsule by mouth Daily., Disp: , Rfl:     levothyroxine (SYNTHROID, LEVOTHROID) 75 MCG tablet, Take 1 tablet by mouth Daily., Disp: , Rfl:     pregabalin (LYRICA) 50 MG capsule, Take 1 capsule by mouth Every Night., Disp: , Rfl:     propranolol (INDERAL) 60 MG tablet, Take 1 tablet by mouth 2 (Two) Times a Day., Disp: , Rfl:     simvastatin (ZOCOR) 40 MG tablet, Take 1 tablet by mouth Every Night., Disp: , Rfl:     Tirzepatide (Mounjaro) 10 MG/0.5ML solution pen-injector pen, Inject 0.5 mL under the skin into the appropriate area as directed 1 (One) Time Per Week., Disp: , Rfl:     traMADol (ULTRAM) 50 MG tablet, Take 1 tablet by mouth 3 (Three) Times a Day., Disp: , Rfl:     amitriptyline (ELAVIL) 10 MG tablet, , Disp: , Rfl:     Tirzepatide (Mounjaro) 2.5 MG/0.5ML solution pen-injector, Inject 0.5 mL under the skin into the appropriate area as directed 1 (One) Time Per Week. WEDNESDAYS  10 mg, Disp: , Rfl:       Objective:     Vitals:    07/09/24 1403   BP: 118/70   Pulse: 70   Weight: 86.7 kg (191 lb 3.2 oz)   Height: 167.6 cm (66\")     Body mass index is 30.86 kg/m².    Constitutional:       Appearance: Well-developed.   Eyes:      General: No scleral icterus.  HENT:      Head: Normocephalic.   Neck:      Thyroid: No thyromegaly.      Vascular: No JVD.      Lymphadenopathy: No cervical adenopathy.   Pulmonary:      Effort: Pulmonary effort is normal.      Breath sounds: Normal breath " sounds. No wheezing. No rales.   Cardiovascular:      Normal rate. Regular rhythm.      No gallop.    Edema:     Peripheral edema absent.   Abdominal:      Palpations: Abdomen is soft.      Tenderness: There is no abdominal tenderness.   Musculoskeletal: Normal range of motion. Skin:     General: Skin is warm and dry.      Findings: No rash.   Neurological:      Mental Status: Alert and oriented to person, place, and time.           ECG 12 Lead    Date/Time: 7/9/2024 2:38 PM  Performed by: Brian Weems MD    Authorized by: Brian Weems MD  Comparison: compared with previous ECG   Similar to previous ECG  Rhythm: sinus rhythm    Clinical impression: normal ECG           Assessment:       Diagnosis Plan   1. Bilateral pulmonary embolism        2. PVC (premature ventricular contraction)        3. Essential hypertension        4. Class 2 obesity due to excess calories without serious comorbidity with body mass index (BMI) of 35.0 to 35.9 in adult               Plan:       I think that she is doing very well she has made some major life changes she has lost about 35 pounds still is a little bit overweight but she is much better than she was.  At this point her PE was provoked and occurred in the setting of surgery she is lost a bunch of weight I think she could come off Eliquis altogether and see how she does off of it of course there is an increased risk of DVT and PE and certainly if she had a recurrence we did keep her on it forever.  She understands is good with that approach I will just have her come see us as needed    No follow-ups on file.     As always, it has been a pleasure to participate in your patient's care.      Sincerely,       Brian Weems MD

## (undated) DEVICE — GLV SURG BIOGEL LTX PF 7

## (undated) DEVICE — ANTIBACTERIAL UNDYED BRAIDED (POLYGLACTIN 910), SYNTHETIC ABSORBABLE SUTURE: Brand: COATED VICRYL

## (undated) DEVICE — TOOL MR8-15MH30 MR8 15CM MATCH 3MM: Brand: MIDAS REX MR8

## (undated) DEVICE — SPONGE,NEURO,.75"X.75",XR,STRL,LF,10/PK: Brand: MEDLINE

## (undated) DEVICE — SHEET, DRAPE, SPLIT, STERILE: Brand: MEDLINE

## (undated) DEVICE — TUBING, SUCTION, 1/4" X 20', STRAIGHT: Brand: MEDLINE INDUSTRIES, INC.

## (undated) DEVICE — SUT SILK 2/0 TIES 18IN A185H

## (undated) DEVICE — COVER,TABLE,HEAVY DUTY,79"X110",STRL: Brand: MEDLINE

## (undated) DEVICE — GW EMR FIX EXCHG J STD .035 3MM 260CM

## (undated) DEVICE — NEEDLE, QUINCKE, 18GX3.5": Brand: MEDLINE

## (undated) DEVICE — SUT SILK 2/0 FS BLK 18IN 685G

## (undated) DEVICE — TOOL MR8-15BA50T MR8 15CM BAL SYMTRI 5MM: Brand: MIDAS REX MR8

## (undated) DEVICE — DISPOSABLE IRRIGATION BIPOLAR CORD, M1000 TYPE: Brand: KIRWAN

## (undated) DEVICE — COLR CERV 3IN XL

## (undated) DEVICE — ENDOVASCULAR DEVICE: Brand: EKOS™ +

## (undated) DEVICE — PATIENT RETURN ELECTRODE, SINGLE-USE, CONTACT QUALITY MONITORING, ADULT, WITH 9FT CORD, FOR PATIENTS WEIGING OVER 33LBS. (15KG): Brand: MEGADYNE

## (undated) DEVICE — ELECTRD BLD EZ CLN MOD XLNG 2.75IN

## (undated) DEVICE — KT MANIFLD CARDIAC

## (undated) DEVICE — INTRO SHEATH ART/FEM ENGAGE .035 7F12CM

## (undated) DEVICE — Device

## (undated) DEVICE — CATH PULM WEDGE PRESS 7F

## (undated) DEVICE — GLV SURG SENSICARE W/ALOE PF LF 7.5 STRL

## (undated) DEVICE — 3M™ STERI-STRIP™ ANTIMICROBIAL SKIN CLOSURES 1/2 INCH X 4 INCHES 50/CARTON 4 CARTONS/CASE A1847: Brand: 3M™ STERI-STRIP™

## (undated) DEVICE — PK NEURO SPINE 40

## (undated) DEVICE — TRAP FLD MINIVAC MEGADYNE 100ML

## (undated) DEVICE — TBG PENCL TELESCP MEGADYNE SMOKE EVAC 10FT

## (undated) DEVICE — INTENDED FOR TISSUE SEPARATION, AND OTHER PROCEDURES THAT REQUIRE A SHARP SURGICAL BLADE TO PUNCTURE OR CUT.: Brand: BARD-PARKER ® STAINLESS STEEL BLADES

## (undated) DEVICE — SOL ISO/ALC RUB 70PCT 4OZ

## (undated) DEVICE — DRSNG WND GZ PAD BORDERED 4X8IN STRL

## (undated) DEVICE — JACKSON-PRATT 100CC BULB RESERVOIR: Brand: CARDINAL HEALTH

## (undated) DEVICE — DRN WND JP RND W TROC SIL 10F 1/8IN

## (undated) DEVICE — CONN TBG Y 5 IN 1 LF STRL

## (undated) DEVICE — ADHS LIQ MASTISOL 2/3ML

## (undated) DEVICE — 1 ML TUBERCULIN SYRINGE REGULAR TIP: Brand: MONOJECT

## (undated) DEVICE — PK CATH CARD 40

## (undated) DEVICE — DRP MICROSCOPE 4 BINOCULAR CV 54X150IN

## (undated) DEVICE — SMOKE EVACUATION TUBING WITH 7/8 IN TO 1/4 IN REDUCER: Brand: BUFFALO FILTER